# Patient Record
Sex: FEMALE | Race: WHITE | NOT HISPANIC OR LATINO | Employment: PART TIME | URBAN - METROPOLITAN AREA
[De-identification: names, ages, dates, MRNs, and addresses within clinical notes are randomized per-mention and may not be internally consistent; named-entity substitution may affect disease eponyms.]

---

## 2017-01-10 ENCOUNTER — OFFICE VISIT - HEALTHEAST (OUTPATIENT)
Dept: FAMILY MEDICINE | Facility: CLINIC | Age: 42
End: 2017-01-10

## 2017-01-10 DIAGNOSIS — R87.619 ABNORMAL PAP SMEAR OF CERVIX: ICD-10-CM

## 2017-01-10 DIAGNOSIS — F41.9 ANXIETY: ICD-10-CM

## 2017-01-10 DIAGNOSIS — Z11.3 SCREEN FOR STD (SEXUALLY TRANSMITTED DISEASE): ICD-10-CM

## 2017-01-10 LAB — HIV 1+2 AB+HIV1 P24 AG SERPL QL IA: NEGATIVE

## 2017-01-11 LAB
HAV IGM SERPL QL IA: NEGATIVE
HBV CORE IGM SERPL QL IA: NEGATIVE
HBV SURFACE AG SERPL QL IA: NEGATIVE
HCV AB SERPL QL IA: NEGATIVE
HSV1 IGG SERPL QL IA: POSITIVE
HSV2 IGG SERPL QL IA: NEGATIVE
SYPHILIS RPR SCREEN - HISTORICAL: NORMAL

## 2017-01-13 ENCOUNTER — COMMUNICATION - HEALTHEAST (OUTPATIENT)
Dept: FAMILY MEDICINE | Facility: CLINIC | Age: 42
End: 2017-01-13

## 2017-01-13 DIAGNOSIS — F39 MOOD DISORDER (H): ICD-10-CM

## 2017-01-13 DIAGNOSIS — F41.9 ANXIETY: ICD-10-CM

## 2017-01-16 ENCOUNTER — COMMUNICATION - HEALTHEAST (OUTPATIENT)
Dept: FAMILY MEDICINE | Facility: CLINIC | Age: 42
End: 2017-01-16

## 2017-01-17 LAB
HPV INTERPRETATION - HISTORICAL: ABNORMAL
HPV INTERPRETER - HISTORICAL: ABNORMAL

## 2017-01-18 ENCOUNTER — AMBULATORY - HEALTHEAST (OUTPATIENT)
Dept: FAMILY MEDICINE | Facility: CLINIC | Age: 42
End: 2017-01-18

## 2017-01-18 DIAGNOSIS — R87.619 ABNORMAL PAP SMEAR OF CERVIX: ICD-10-CM

## 2017-01-18 LAB
BKR LAB AP ABNORMAL BLEEDING: NO
BKR LAB AP BIRTH CONTROL/HORMONES: ABNORMAL
BKR LAB AP CERVICAL APPEARANCE: NORMAL
BKR LAB AP GYN ADEQUACY: ABNORMAL
BKR LAB AP GYN INTERPRETATION: ABNORMAL
BKR LAB AP HPV REFLEX: ABNORMAL
BKR LAB AP LMP: ABNORMAL
BKR LAB AP PATIENT STATUS: ABNORMAL
BKR LAB AP PREVIOUS ABNORMAL: 2016
BKR LAB AP PREVIOUS NORMAL: ABNORMAL
HIGH RISK?: NO
PATH REPORT.COMMENTS IMP SPEC: ABNORMAL
RESULT FLAG (HE HISTORICAL CONVERSION): ABNORMAL

## 2017-01-20 ENCOUNTER — RECORDS - HEALTHEAST (OUTPATIENT)
Dept: ADMINISTRATIVE | Facility: OTHER | Age: 42
End: 2017-01-20

## 2017-01-24 ENCOUNTER — COMMUNICATION - HEALTHEAST (OUTPATIENT)
Dept: FAMILY MEDICINE | Facility: CLINIC | Age: 42
End: 2017-01-24

## 2017-01-24 DIAGNOSIS — F41.9 ANXIETY: ICD-10-CM

## 2017-01-25 ENCOUNTER — COMMUNICATION - HEALTHEAST (OUTPATIENT)
Dept: FAMILY MEDICINE | Facility: CLINIC | Age: 42
End: 2017-01-25

## 2017-01-25 RX ORDER — LORAZEPAM 0.5 MG/1
TABLET ORAL
Qty: 30 TABLET | Refills: 0 | Status: SHIPPED | OUTPATIENT
Start: 2017-01-25

## 2017-03-01 ENCOUNTER — RECORDS - HEALTHEAST (OUTPATIENT)
Dept: ADMINISTRATIVE | Facility: OTHER | Age: 42
End: 2017-03-01

## 2017-03-20 ENCOUNTER — COMMUNICATION - HEALTHEAST (OUTPATIENT)
Dept: FAMILY MEDICINE | Facility: CLINIC | Age: 42
End: 2017-03-20

## 2017-06-01 ENCOUNTER — RECORDS - HEALTHEAST (OUTPATIENT)
Dept: ADMINISTRATIVE | Facility: OTHER | Age: 42
End: 2017-06-01

## 2017-06-02 ENCOUNTER — RECORDS - HEALTHEAST (OUTPATIENT)
Dept: ADMINISTRATIVE | Facility: OTHER | Age: 42
End: 2017-06-02

## 2017-06-12 ENCOUNTER — RECORDS - HEALTHEAST (OUTPATIENT)
Dept: ADMINISTRATIVE | Facility: OTHER | Age: 42
End: 2017-06-12

## 2017-06-12 ENCOUNTER — RECORDS - HEALTHEAST (OUTPATIENT)
Dept: BONE DENSITY | Facility: CLINIC | Age: 42
End: 2017-06-12

## 2017-06-12 DIAGNOSIS — K50.80 CROHN'S DISEASE OF BOTH SMALL AND LARGE INTESTINE WITHOUT COMPLICATIONS (H): ICD-10-CM

## 2017-07-07 ENCOUNTER — COMMUNICATION - HEALTHEAST (OUTPATIENT)
Dept: SCHEDULING | Facility: CLINIC | Age: 42
End: 2017-07-07

## 2017-08-17 ENCOUNTER — COMMUNICATION - HEALTHEAST (OUTPATIENT)
Dept: FAMILY MEDICINE | Facility: CLINIC | Age: 42
End: 2017-08-17

## 2017-10-02 ENCOUNTER — RECORDS - HEALTHEAST (OUTPATIENT)
Dept: ADMINISTRATIVE | Facility: OTHER | Age: 42
End: 2017-10-02

## 2017-11-21 ENCOUNTER — OFFICE VISIT - HEALTHEAST (OUTPATIENT)
Dept: FAMILY MEDICINE | Facility: CLINIC | Age: 42
End: 2017-11-21

## 2017-11-21 ENCOUNTER — COMMUNICATION - HEALTHEAST (OUTPATIENT)
Dept: FAMILY MEDICINE | Facility: CLINIC | Age: 42
End: 2017-11-21

## 2017-11-21 DIAGNOSIS — N93.8 DYSFUNCTIONAL UTERINE BLEEDING: ICD-10-CM

## 2017-11-21 DIAGNOSIS — R19.7 DIARRHEA: ICD-10-CM

## 2017-11-21 DIAGNOSIS — R03.0 BLOOD PRESSURE ELEVATED WITHOUT HISTORY OF HTN: ICD-10-CM

## 2017-11-21 DIAGNOSIS — R87.613 HSIL ON PAP SMEAR OF CERVIX: ICD-10-CM

## 2017-11-21 DIAGNOSIS — Z01.818 PREOP EXAMINATION: ICD-10-CM

## 2017-11-21 RX ORDER — DULOXETIN HYDROCHLORIDE 30 MG/1
CAPSULE, DELAYED RELEASE ORAL
Refills: 2 | Status: SHIPPED | COMMUNITY
Start: 2017-11-10 | End: 2023-12-02

## 2017-11-21 ASSESSMENT — MIFFLIN-ST. JEOR: SCORE: 1402.04

## 2017-11-22 LAB
ATRIAL RATE - MUSE: 76 BPM
DIASTOLIC BLOOD PRESSURE - MUSE: NORMAL MMHG
INTERPRETATION ECG - MUSE: NORMAL
P AXIS - MUSE: 37 DEGREES
PR INTERVAL - MUSE: 134 MS
QRS DURATION - MUSE: 94 MS
QT - MUSE: 386 MS
QTC - MUSE: 434 MS
R AXIS - MUSE: 12 DEGREES
SYSTOLIC BLOOD PRESSURE - MUSE: NORMAL MMHG
T AXIS - MUSE: 26 DEGREES
VENTRICULAR RATE- MUSE: 76 BPM

## 2017-12-01 ENCOUNTER — AMBULATORY - HEALTHEAST (OUTPATIENT)
Dept: LAB | Facility: CLINIC | Age: 42
End: 2017-12-01

## 2017-12-01 DIAGNOSIS — R19.7 DIARRHEA: ICD-10-CM

## 2017-12-05 ENCOUNTER — RECORDS - HEALTHEAST (OUTPATIENT)
Dept: ADMINISTRATIVE | Facility: OTHER | Age: 42
End: 2017-12-05

## 2017-12-05 LAB
LAB AP CHARGES (HE HISTORICAL CONVERSION): ABNORMAL
PATH REPORT.COMMENTS IMP SPEC: ABNORMAL
PATH REPORT.COMMENTS IMP SPEC: ABNORMAL
PATH REPORT.FINAL DX SPEC: ABNORMAL
PATH REPORT.GROSS SPEC: ABNORMAL
PATH REPORT.MICROSCOPIC SPEC OTHER STN: ABNORMAL
PATH REPORT.MICROSCOPIC SPEC OTHER STN: ABNORMAL
PATH REPORT.RELEVANT HX SPEC: ABNORMAL
RESULT FLAG (HE HISTORICAL CONVERSION): ABNORMAL

## 2017-12-19 ENCOUNTER — COMMUNICATION - HEALTHEAST (OUTPATIENT)
Dept: FAMILY MEDICINE | Facility: CLINIC | Age: 42
End: 2017-12-19

## 2017-12-19 DIAGNOSIS — I10 HTN (HYPERTENSION): ICD-10-CM

## 2018-02-08 ENCOUNTER — COMMUNICATION - HEALTHEAST (OUTPATIENT)
Dept: FAMILY MEDICINE | Facility: CLINIC | Age: 43
End: 2018-02-08

## 2018-02-22 ENCOUNTER — RECORDS - HEALTHEAST (OUTPATIENT)
Dept: ADMINISTRATIVE | Facility: OTHER | Age: 43
End: 2018-02-22

## 2018-04-27 ENCOUNTER — COMMUNICATION - HEALTHEAST (OUTPATIENT)
Dept: SCHEDULING | Facility: CLINIC | Age: 43
End: 2018-04-27

## 2018-04-28 ENCOUNTER — OFFICE VISIT - HEALTHEAST (OUTPATIENT)
Dept: FAMILY MEDICINE | Facility: CLINIC | Age: 43
End: 2018-04-28

## 2018-04-28 DIAGNOSIS — N94.89 VAGINAL BURNING: ICD-10-CM

## 2018-04-28 DIAGNOSIS — R39.9 UTI SYMPTOMS: ICD-10-CM

## 2018-04-28 DIAGNOSIS — N89.8 VAGINAL DISCHARGE: ICD-10-CM

## 2018-04-28 LAB
ALBUMIN UR-MCNC: NEGATIVE MG/DL
APPEARANCE UR: CLEAR
BACTERIA #/AREA URNS HPF: ABNORMAL HPF
BILIRUB UR QL STRIP: ABNORMAL
CAOX CRY #/AREA URNS HPF: PRESENT /[HPF]
CLUE CELLS: NORMAL
COLOR UR AUTO: YELLOW
GLUCOSE UR STRIP-MCNC: NEGATIVE MG/DL
HGB UR QL STRIP: NEGATIVE
KETONES UR STRIP-MCNC: NEGATIVE MG/DL
LEUKOCYTE ESTERASE UR QL STRIP: NEGATIVE
MUCOUS THREADS #/AREA URNS LPF: ABNORMAL LPF
NITRATE UR QL: NEGATIVE
PH UR STRIP: 5.5 [PH] (ref 5–8)
RBC #/AREA URNS AUTO: ABNORMAL HPF
SP GR UR STRIP: >=1.03 (ref 1–1.03)
SQUAMOUS #/AREA URNS AUTO: ABNORMAL LPF
T PALLIDUM AB SER QL: NEGATIVE
TRICHOMONAS, WET PREP: NORMAL
UROBILINOGEN UR STRIP-ACNC: ABNORMAL
WBC #/AREA URNS AUTO: ABNORMAL HPF
YEAST, WET PREP: NORMAL

## 2018-05-01 LAB
C TRACH DNA SPEC QL PROBE+SIG AMP: NEGATIVE
N GONORRHOEA DNA SPEC QL NAA+PROBE: NEGATIVE

## 2018-06-18 ENCOUNTER — RECORDS - HEALTHEAST (OUTPATIENT)
Dept: ADMINISTRATIVE | Facility: OTHER | Age: 43
End: 2018-06-18

## 2018-06-19 ENCOUNTER — RECORDS - HEALTHEAST (OUTPATIENT)
Dept: ADMINISTRATIVE | Facility: OTHER | Age: 43
End: 2018-06-19

## 2018-06-25 ENCOUNTER — RECORDS - HEALTHEAST (OUTPATIENT)
Dept: ADMINISTRATIVE | Facility: OTHER | Age: 43
End: 2018-06-25

## 2018-07-09 ENCOUNTER — RECORDS - HEALTHEAST (OUTPATIENT)
Dept: ADMINISTRATIVE | Facility: OTHER | Age: 43
End: 2018-07-09

## 2018-12-13 ENCOUNTER — RECORDS - HEALTHEAST (OUTPATIENT)
Dept: ADMINISTRATIVE | Facility: OTHER | Age: 43
End: 2018-12-13

## 2019-01-03 ENCOUNTER — OFFICE VISIT - HEALTHEAST (OUTPATIENT)
Dept: FAMILY MEDICINE | Facility: CLINIC | Age: 44
End: 2019-01-03

## 2019-01-03 DIAGNOSIS — K64.4 EXTERNAL HEMORRHOIDS: ICD-10-CM

## 2019-01-03 DIAGNOSIS — R39.9 UTI SYMPTOMS: ICD-10-CM

## 2019-01-03 DIAGNOSIS — R35.0 URINARY FREQUENCY: ICD-10-CM

## 2019-01-03 LAB
ALBUMIN UR-MCNC: NEGATIVE MG/DL
APPEARANCE UR: CLEAR
BACTERIA #/AREA URNS HPF: ABNORMAL HPF
BILIRUB UR QL STRIP: NEGATIVE
COLOR UR AUTO: YELLOW
GLUCOSE UR STRIP-MCNC: NEGATIVE MG/DL
HGB UR QL STRIP: NEGATIVE
KETONES UR STRIP-MCNC: ABNORMAL MG/DL
LEUKOCYTE ESTERASE UR QL STRIP: ABNORMAL
NITRATE UR QL: NEGATIVE
PH UR STRIP: 6.5 [PH] (ref 5–8)
RBC #/AREA URNS AUTO: ABNORMAL HPF
SP GR UR STRIP: 1.02 (ref 1–1.03)
SQUAMOUS #/AREA URNS AUTO: ABNORMAL LPF
TRANS CELLS #/AREA URNS HPF: ABNORMAL LPF
UROBILINOGEN UR STRIP-ACNC: ABNORMAL
WBC #/AREA URNS AUTO: ABNORMAL HPF
WBC CLUMPS #/AREA URNS HPF: ABNORMAL /[HPF]

## 2019-01-05 ENCOUNTER — AMBULATORY - HEALTHEAST (OUTPATIENT)
Dept: FAMILY MEDICINE | Facility: CLINIC | Age: 44
End: 2019-01-05

## 2019-01-05 ENCOUNTER — COMMUNICATION - HEALTHEAST (OUTPATIENT)
Dept: FAMILY MEDICINE | Facility: CLINIC | Age: 44
End: 2019-01-05

## 2019-01-05 DIAGNOSIS — N30.00 ACUTE CYSTITIS WITHOUT HEMATURIA: ICD-10-CM

## 2019-01-05 LAB — BACTERIA SPEC CULT: ABNORMAL

## 2019-01-06 ENCOUNTER — AMBULATORY - HEALTHEAST (OUTPATIENT)
Dept: FAMILY MEDICINE | Facility: CLINIC | Age: 44
End: 2019-01-06

## 2019-01-06 DIAGNOSIS — K64.9 HEMORRHOIDS, UNSPECIFIED HEMORRHOID TYPE: ICD-10-CM

## 2019-02-25 ENCOUNTER — HOSPITAL ENCOUNTER (OUTPATIENT)
Dept: MAMMOGRAPHY | Facility: CLINIC | Age: 44
Discharge: HOME | End: 2019-02-25
Attending: FAMILY MEDICINE

## 2019-02-25 DIAGNOSIS — Z12.31 VISIT FOR SCREENING MAMMOGRAM: ICD-10-CM

## 2019-03-05 ENCOUNTER — RECORDS - HEALTHEAST (OUTPATIENT)
Dept: ADMINISTRATIVE | Facility: OTHER | Age: 44
End: 2019-03-05

## 2019-03-07 ENCOUNTER — OFFICE VISIT - HEALTHEAST (OUTPATIENT)
Dept: FAMILY MEDICINE | Facility: CLINIC | Age: 44
End: 2019-03-07

## 2019-03-07 DIAGNOSIS — K50.919 CROHN'S DISEASE WITH COMPLICATION, UNSPECIFIED GASTROINTESTINAL TRACT LOCATION (H): ICD-10-CM

## 2019-03-07 DIAGNOSIS — S82.852D CLOSED TRIMALLEOLAR FRACTURE OF LEFT ANKLE WITH ROUTINE HEALING, SUBSEQUENT ENCOUNTER: ICD-10-CM

## 2019-03-07 DIAGNOSIS — L70.0 ACNE VULGARIS: ICD-10-CM

## 2019-03-07 DIAGNOSIS — F32.5 MAJOR DEPRESSIVE DISORDER, SINGLE EPISODE IN FULL REMISSION (H): ICD-10-CM

## 2019-03-07 DIAGNOSIS — Z01.818 PREOP GENERAL PHYSICAL EXAM: ICD-10-CM

## 2019-03-07 DIAGNOSIS — F41.1 ANXIETY STATE: ICD-10-CM

## 2019-03-07 LAB
HCG UR QL: NEGATIVE
HGB BLD-MCNC: 13.9 G/DL (ref 12–16)
POTASSIUM BLD-SCNC: 4.1 MMOL/L (ref 3.5–5)

## 2019-03-07 ASSESSMENT — MIFFLIN-ST. JEOR: SCORE: 1397.51

## 2019-03-11 ENCOUNTER — RECORDS - HEALTHEAST (OUTPATIENT)
Dept: ADMINISTRATIVE | Facility: OTHER | Age: 44
End: 2019-03-11

## 2019-03-14 ENCOUNTER — RECORDS - HEALTHEAST (OUTPATIENT)
Dept: ADMINISTRATIVE | Facility: OTHER | Age: 44
End: 2019-03-14

## 2019-03-26 ENCOUNTER — RECORDS - HEALTHEAST (OUTPATIENT)
Dept: ADMINISTRATIVE | Facility: OTHER | Age: 44
End: 2019-03-26

## 2019-03-28 ENCOUNTER — RECORDS - HEALTHEAST (OUTPATIENT)
Dept: ADMINISTRATIVE | Facility: OTHER | Age: 44
End: 2019-03-28

## 2019-04-02 ENCOUNTER — RECORDS - HEALTHEAST (OUTPATIENT)
Dept: ADMINISTRATIVE | Facility: OTHER | Age: 44
End: 2019-04-02

## 2019-04-24 ENCOUNTER — RECORDS - HEALTHEAST (OUTPATIENT)
Dept: ADMINISTRATIVE | Facility: OTHER | Age: 44
End: 2019-04-24

## 2019-06-04 ENCOUNTER — RECORDS - HEALTHEAST (OUTPATIENT)
Dept: ADMINISTRATIVE | Facility: OTHER | Age: 44
End: 2019-06-04

## 2019-06-14 ENCOUNTER — RECORDS - HEALTHEAST (OUTPATIENT)
Dept: ADMINISTRATIVE | Facility: OTHER | Age: 44
End: 2019-06-14

## 2019-07-09 ENCOUNTER — RECORDS - HEALTHEAST (OUTPATIENT)
Dept: ADMINISTRATIVE | Facility: OTHER | Age: 44
End: 2019-07-09

## 2019-07-31 ENCOUNTER — OFFICE VISIT - HEALTHEAST (OUTPATIENT)
Dept: FAMILY MEDICINE | Facility: CLINIC | Age: 44
End: 2019-07-31

## 2019-07-31 DIAGNOSIS — F41.1 ANXIETY STATE: ICD-10-CM

## 2019-07-31 DIAGNOSIS — Z01.818 PREOP GENERAL PHYSICAL EXAM: ICD-10-CM

## 2019-07-31 DIAGNOSIS — M25.462 PAIN AND SWELLING OF KNEE, LEFT: ICD-10-CM

## 2019-07-31 DIAGNOSIS — F32.5 MAJOR DEPRESSIVE DISORDER, SINGLE EPISODE IN FULL REMISSION (H): ICD-10-CM

## 2019-07-31 DIAGNOSIS — M25.562 PAIN AND SWELLING OF KNEE, LEFT: ICD-10-CM

## 2019-07-31 DIAGNOSIS — L70.0 ACNE VULGARIS: ICD-10-CM

## 2019-07-31 LAB
HCG UR QL: NEGATIVE
HGB BLD-MCNC: 13.8 G/DL (ref 12–16)
POTASSIUM BLD-SCNC: 4 MMOL/L (ref 3.5–5)

## 2019-07-31 ASSESSMENT — MIFFLIN-ST. JEOR: SCORE: 1452.79

## 2019-08-08 ENCOUNTER — RECORDS - HEALTHEAST (OUTPATIENT)
Dept: ADMINISTRATIVE | Facility: OTHER | Age: 44
End: 2019-08-08

## 2019-09-11 ENCOUNTER — RECORDS - HEALTHEAST (OUTPATIENT)
Dept: ADMINISTRATIVE | Facility: OTHER | Age: 44
End: 2019-09-11

## 2019-09-17 ENCOUNTER — RECORDS - HEALTHEAST (OUTPATIENT)
Dept: ADMINISTRATIVE | Facility: OTHER | Age: 44
End: 2019-09-17

## 2019-10-11 ENCOUNTER — OFFICE VISIT - HEALTHEAST (OUTPATIENT)
Dept: FAMILY MEDICINE | Facility: CLINIC | Age: 44
End: 2019-10-11

## 2019-10-11 DIAGNOSIS — B96.89 BACTERIAL VAGINOSIS: ICD-10-CM

## 2019-10-11 DIAGNOSIS — N89.8 VAGINAL ODOR: ICD-10-CM

## 2019-10-11 DIAGNOSIS — N76.0 BACTERIAL VAGINOSIS: ICD-10-CM

## 2019-10-11 LAB
CLUE CELLS: ABNORMAL
TRICHOMONAS, WET PREP: ABNORMAL
YEAST, WET PREP: ABNORMAL

## 2019-10-16 ENCOUNTER — RECORDS - HEALTHEAST (OUTPATIENT)
Dept: ADMINISTRATIVE | Facility: OTHER | Age: 44
End: 2019-10-16

## 2019-10-21 ENCOUNTER — COMMUNICATION - HEALTHEAST (OUTPATIENT)
Dept: FAMILY MEDICINE | Facility: CLINIC | Age: 44
End: 2019-10-21

## 2019-10-21 DIAGNOSIS — B96.89 BV (BACTERIAL VAGINOSIS): ICD-10-CM

## 2019-10-21 DIAGNOSIS — N76.0 BV (BACTERIAL VAGINOSIS): ICD-10-CM

## 2019-10-22 ENCOUNTER — OFFICE VISIT - HEALTHEAST (OUTPATIENT)
Dept: FAMILY MEDICINE | Facility: CLINIC | Age: 44
End: 2019-10-22

## 2019-10-22 DIAGNOSIS — R30.0 DYSURIA: ICD-10-CM

## 2019-10-22 LAB
ALBUMIN UR-MCNC: NEGATIVE MG/DL
APPEARANCE UR: CLEAR
BACTERIA #/AREA URNS HPF: ABNORMAL HPF
BILIRUB UR QL STRIP: NEGATIVE
CLUE CELLS: NORMAL
COLOR UR AUTO: YELLOW
GLUCOSE UR STRIP-MCNC: NEGATIVE MG/DL
HGB UR QL STRIP: ABNORMAL
KETONES UR STRIP-MCNC: NEGATIVE MG/DL
LEUKOCYTE ESTERASE UR QL STRIP: ABNORMAL
NITRATE UR QL: POSITIVE
PH UR STRIP: 7 [PH] (ref 5–8)
RBC #/AREA URNS AUTO: ABNORMAL HPF
SP GR UR STRIP: 1.02 (ref 1–1.03)
SQUAMOUS #/AREA URNS AUTO: ABNORMAL LPF
TRANS CELLS #/AREA URNS HPF: ABNORMAL LPF
TRICHOMONAS, WET PREP: NORMAL
UROBILINOGEN UR STRIP-ACNC: ABNORMAL
WBC #/AREA URNS AUTO: ABNORMAL HPF
YEAST, WET PREP: NORMAL

## 2019-10-23 LAB
BACTERIA SPEC CULT: ABNORMAL
BACTERIA SPEC CULT: ABNORMAL

## 2019-11-06 ENCOUNTER — RECORDS - HEALTHEAST (OUTPATIENT)
Dept: ADMINISTRATIVE | Facility: OTHER | Age: 44
End: 2019-11-06

## 2019-11-09 ENCOUNTER — RECORDS - HEALTHEAST (OUTPATIENT)
Dept: ADMINISTRATIVE | Facility: OTHER | Age: 44
End: 2019-11-09

## 2020-01-08 ENCOUNTER — RECORDS - HEALTHEAST (OUTPATIENT)
Dept: ADMINISTRATIVE | Facility: OTHER | Age: 45
End: 2020-01-08

## 2021-02-22 ENCOUNTER — OFFICE VISIT - HEALTHEAST (OUTPATIENT)
Dept: FAMILY MEDICINE | Facility: CLINIC | Age: 46
End: 2021-02-22

## 2021-02-22 DIAGNOSIS — R05.9 COUGH: ICD-10-CM

## 2021-02-23 ENCOUNTER — AMBULATORY - HEALTHEAST (OUTPATIENT)
Dept: FAMILY MEDICINE | Facility: CLINIC | Age: 46
End: 2021-02-23

## 2021-02-23 DIAGNOSIS — R05.9 COUGH: ICD-10-CM

## 2021-02-23 LAB — SARS-COV-2 PCR RESULT-HE - HISTORICAL: NEGATIVE

## 2021-02-24 ENCOUNTER — COMMUNICATION - HEALTHEAST (OUTPATIENT)
Dept: SCHEDULING | Facility: CLINIC | Age: 46
End: 2021-02-24

## 2021-05-18 ENCOUNTER — RECORDS - HEALTHEAST (OUTPATIENT)
Dept: ADMINISTRATIVE | Facility: OTHER | Age: 46
End: 2021-05-18

## 2021-05-30 ENCOUNTER — RECORDS - HEALTHEAST (OUTPATIENT)
Dept: ADMINISTRATIVE | Facility: CLINIC | Age: 46
End: 2021-05-30

## 2021-05-30 VITALS — WEIGHT: 153.56 LBS | BODY MASS INDEX: 24.41 KG/M2

## 2021-05-31 VITALS — WEIGHT: 159.25 LBS | HEIGHT: 67 IN | BODY MASS INDEX: 24.99 KG/M2

## 2021-05-31 NOTE — PROGRESS NOTES
Preoperative Exam    Scheduled Procedure: right knee surgery  Surgery Date:  8/8/19  Surgery Location: [facility] in [place]    Surgeon:  Dr. Carmen    Assessment/Plan:       1.  Preop general physical exam,   Pain and swelling of knee, left  -     Hemoglobin  -     Potassium  -     Pregnancy (Beta-hCG, Qual), Urine    Surgical Procedure Risk: Low (reported cardiac risk generally < 1%)  Have you had prior anesthesia?: Yes  Have you or any family members had a previous anesthesia reaction:  Yes: nausea  Do you or any family members have a history of a clotting or bleeding disorder?: No  Cardiac Risk Assessment: no increased risk for major cardiac complications    APPROVAL GIVEN to proceed with proposed procedure, without further diagnostic evaluation    Functional Status: Independent  Patient plans to recover at home with family.     2.  Major Depression, Anxiety  Stable on duloxetine  No change in medication in preparation for surgery    3.  Acne vulgaris  Stable on spironolactone  Check a potassium  No change in medication in preparation for surgery    Subjective:      Flor Cisneros is a 43 y.o. female who presents for a preoperative consultation. She injured her right knee and experienced swelling which has not reduced. An orthopedist attributes pain to a meniscus tear and cartilage tear. Patient has not had any anesthesia or bleeding problems in past surgeries. She denies dyspnea, chest pain, emesis, nausea, fevers and chills. She no longer has menstrual cycles.    All other systems reviewed and are negative, other than those listed in the HPI.    Pertinent History  Do you have difficulty breathing or chest pain after walking up a flight of stairs: No  History of obstructive sleep apnea: No  Steroid use in the last 6 months: No  Frequent Aspirin/NSAID use: No  Prior Blood Transfusion: No  Prior Blood Transfusion Reaction: No  If for some reason prior to, during or after the procedure, if it is medically  indicated, would you be willing to have a blood transfusion?:  There is no transfusion refusal.    Current Outpatient Medications   Medication Sig Dispense Refill     DULoxetine (CYMBALTA) 30 MG capsule TK ONE C PO  QAM  2     LORazepam (ATIVAN) 0.5 MG tablet Take one tablet daily as needed 30 tablet 0     spironolactone (ALDACTONE) 100 MG tablet TK 1 T PO DAILY. DO NOT TK IF PREGNANT. MAY LOWER BLOOD PRESSURE. CALL PHYSICIAN IF DIZZINESS DEVELOPS  3     No current facility-administered medications for this visit.         Allergies   Allergen Reactions     Infliximab      Metronidazole      Prochlorperazine Edisylate        Patient Active Problem List   Diagnosis     Crohn's Disease     Rectal Polyps     Major Depression, Single Episode In Remission     Anxiety       History reviewed. No pertinent past medical history.    Past Surgical History:   Procedure Laterality Date     AUGMENTATION MAMMAPLASTY Bilateral 2009    With breast lift       Social History     Socioeconomic History     Marital status:      Spouse name: Not on file     Number of children: Not on file     Years of education: Not on file     Highest education level: Not on file   Occupational History     Not on file   Social Needs     Financial resource strain: Not on file     Food insecurity:     Worry: Not on file     Inability: Not on file     Transportation needs:     Medical: Not on file     Non-medical: Not on file   Tobacco Use     Smoking status: Never Smoker     Smokeless tobacco: Never Used   Substance and Sexual Activity     Alcohol use: Not on file     Drug use: Not on file     Sexual activity: Not on file   Lifestyle     Physical activity:     Days per week: Not on file     Minutes per session: Not on file     Stress: Not on file   Relationships     Social connections:     Talks on phone: Not on file     Gets together: Not on file     Attends Denominational service: Not on file     Active member of club or organization: Not on file      "Attends meetings of clubs or organizations: Not on file     Relationship status: Not on file     Intimate partner violence:     Fear of current or ex partner: Not on file     Emotionally abused: Not on file     Physically abused: Not on file     Forced sexual activity: Not on file   Other Topics Concern     Not on file   Social History Narrative     Not on file       Patient Care Team:  Girish Brady MD as PCP - General          Objective:     Vitals:    07/31/19 1427 07/31/19 1449   BP: (!) 120/96 124/88   Pulse: 85    SpO2: 97%    Weight: 171 lb 5 oz (77.7 kg)    Height: 5' 6.25\" (1.683 m)        Physical Exam:  Constitutional: Patient is oriented to person, place, and time. Patient appears well-developed and well-nourished. No distress.   Head: Normocephalic and atraumatic.   Right Ear: External ear normal.   Left Ear: External ear normal.   Nose: Nose normal.   Mouth/Throat: Oropharynx is clear and moist. No oropharyngeal exudate.   Eyes: Conjunctivae and EOM are normal. Pupils are equal, round, and reactive to light. Right eye exhibits no discharge. Left eye exhibits no discharge. No scleral icterus.   Neck: Neck supple. No JVD present. No tracheal deviation present. No thyromegaly present.   Cardiovascular: Normal rate, regular rhythm, normal heart sounds and intact distal pulses. No murmur heard.   Pulmonary/Chest: Effort normal and breath sounds normal. No stridor. No respiratory distress. Patient has no wheezes, no rales, exhibits no tenderness.   Abdominal: Soft. Bowel sounds are normal. Patient exhibits no distension and no mass. There is no tenderness. There is no rebound and no guarding.   Lymphadenopathy:  Patient has no cervical adenopathy.   Neurological: Patient is alert and oriented to person, place, and time. Patient has normal reflexes. No cranial nerve deficit. Coordination normal.   Skin: Skin is warm and dry. No rash noted. Patient is not diaphoretic. No erythema. No pallor. "   Musculoskeletal: Swelling of the right knee. No redness or warmth to touch.       There are no Patient Instructions on file for this visit.    Labs:  Labs pending at this time.  Results will be reviewed when available.    Immunization History   Administered Date(s) Administered     DT (pediatric) 01/01/1996     Hep A, Adult IM (19yr & older) 08/11/2015     Hep A, historic 03/07/2011     Hep B, historic 01/01/1998     Tdap 05/01/2010     Typhoid, Inj, Inactive 08/11/2015     ADDITIONAL HISTORY SUMMARIZED (2): 3/2/2019 visit reviewed.  DECISION TO OBTAIN EXTRA INFORMATION (1): None.   RADIOLOGY TESTS (1): None.  LABS (1): Labs ordered.  MEDICINE TESTS (1): None.  INDEPENDENT REVIEW (2 each): None.     IGilberto am scribing for and in the presence of, Dr. Balderas.    I, Dr. Girish Martin MD , personally performed the services described in this documentation, as scribed by Gilberto Zuñiga in my presence, and it is both accurate and complete.    Total data points: 3        Electronically signed by Girish Martin MD 07/31/19 2:22 PM

## 2021-06-01 ENCOUNTER — RECORDS - HEALTHEAST (OUTPATIENT)
Dept: ADMINISTRATIVE | Facility: CLINIC | Age: 46
End: 2021-06-01

## 2021-06-01 VITALS — WEIGHT: 144 LBS | BODY MASS INDEX: 22.89 KG/M2

## 2021-06-02 ENCOUNTER — RECORDS - HEALTHEAST (OUTPATIENT)
Dept: ADMINISTRATIVE | Facility: CLINIC | Age: 46
End: 2021-06-02

## 2021-06-02 VITALS — BODY MASS INDEX: 25.71 KG/M2 | HEIGHT: 66 IN | WEIGHT: 160 LBS

## 2021-06-02 VITALS — WEIGHT: 144 LBS | BODY MASS INDEX: 22.89 KG/M2

## 2021-06-02 NOTE — PROGRESS NOTES
Chief Complaint   Patient presents with     vaginal odor         HPI    Patient is here for one day of unspecified vaginal odor. No abnormal vaginal discharge, rash, nor itching. No urinary symptoms. No concern for STIs. No abdominal pain.    ROS: Pertinent ROS noted in HPI.     Allergies   Allergen Reactions     Infliximab      Lactose      Other reaction(s): gas     Metronidazole      Prochlorperazine Edisylate      Thrombin Other (See Comments)     Lupus reaction, rash, LFT elevated       Patient Active Problem List   Diagnosis     Crohn's Disease     Rectal Polyps     Major Depression, Single Episode In Remission     Anxiety       Family History   Problem Relation Age of Onset     Hypertension Father        Social History     Socioeconomic History     Marital status:      Spouse name: Not on file     Number of children: Not on file     Years of education: Not on file     Highest education level: Not on file   Occupational History     Not on file   Social Needs     Financial resource strain: Not on file     Food insecurity:     Worry: Not on file     Inability: Not on file     Transportation needs:     Medical: Not on file     Non-medical: Not on file   Tobacco Use     Smoking status: Never Smoker     Smokeless tobacco: Never Used   Substance and Sexual Activity     Alcohol use: Not on file     Drug use: Not on file     Sexual activity: Not on file   Lifestyle     Physical activity:     Days per week: Not on file     Minutes per session: Not on file     Stress: Not on file   Relationships     Social connections:     Talks on phone: Not on file     Gets together: Not on file     Attends [employer] service: Not on file     Active member of club or organization: Not on file     Attends meetings of clubs or organizations: Not on file     Relationship status: Not on file     Intimate partner violence:     Fear of current or ex partner: Not on file     Emotionally abused: Not on file     Physically abused: Not on  file     Forced sexual activity: Not on file   Other Topics Concern     Not on file   Social History Narrative     Not on file         Objective:    Vitals:    10/11/19 0816   BP: 112/75   Pulse: 85   Resp: 18   Temp: 98.4  F (36.9  C)   SpO2: 98%       Gen:NAD  : Not done. Patient was instructed to obtain vaginal self swab.         Recent Results (from the past 24 hour(s))   Wet Prep, Vaginal   Result Value Ref Range    Yeast Result No yeast seen No yeast seen    Trichomonas No Trichomonas seen No Trichomonas seen    Clue Cells, Wet Prep Clue cells seen (!) No Clue cells seen           Bacterial vaginosis - I confirmed with patient, she requested Flagyl and said she tolerated this well previously (I did discuss alternative like Clindamycin). Avoid alcoholic products as directed.   -     metroNIDAZOLE (FLAGYL) 500 MG tablet; Take 1 tablet (500 mg total) by mouth 2 (two) times a day for 7 days.    Vaginal odor  -     Wet Prep, Vaginal

## 2021-06-02 NOTE — PROGRESS NOTES
Assessment:   1. Dysuria  Symptom is likely secondary to urinary tract infection.   - Urinalysis-UC if Indicated: Positive for nitrate, leukocyte and trace of blood.  - Wet Prep, Vaginal  - Culture, Urine  - nitrofurantoin, macrocrystal-monohydrate, (MACROBID) 100 MG capsule; Take 1 capsule (100 mg total) by mouth 2 (two) times a day for 5 days.  Dispense: 10 capsule; Refill: 0  - fluconazole (DIFLUCAN) 150 MG tablet; Take 1 tablet (150 mg total) by mouth once for 1 dose.  Dispense: 2 tablet; Refill: 0     Plan:   Symptomatic local care discussed.  Vaginal antifungal see orders.  Oral antibiotics see orders.  Follow up with PCP in 2 weeks.     Subjective:   Flor Cisneros is a 43 y.o. female who presents for evaluation of vaginal symptoms of burning and painful perineum and vaginal discharge normal and physiologic. Symptoms have been present for 4 days. Symptoms include urinary symptoms of urinary frequency. Menstrual pattern: bleeding no period. Contraception: none   STI Risk: Very low risk of STD exposure. She has had several positive nitrates and possibly urinary tract infection in the past 10 months.    The following portions of the patient's history were reviewed and updated as appropriate: allergies, current medications, past family history, past medical history, past social history, past surgical history and problem list.    Review of Systems  A 12 point comprehensive review of systems was negative except as noted.      Objective:   /81   Pulse 88   Temp 97.7  F (36.5  C)   Wt 172 lb 4 oz (78.1 kg)   SpO2 98%   BMI 27.59 kg/m    General appearance: alert, appears stated age and cooperative  Head: Normocephalic, without obvious abnormality, atraumatic  Pelvic: cervix normal in appearance, external genitalia normal, no adnexal masses or tenderness, no cervical motion tenderness, rectovaginal septum normal, uterus normal size, shape, and consistency and vagina normal without discharge  Pulses: 2+  and symmetric  Neurologic: Grossly normal

## 2021-06-03 ENCOUNTER — RECORDS - HEALTHEAST (OUTPATIENT)
Dept: ADMINISTRATIVE | Facility: CLINIC | Age: 46
End: 2021-06-03

## 2021-06-03 VITALS — WEIGHT: 171.31 LBS | HEIGHT: 66 IN | BODY MASS INDEX: 27.53 KG/M2

## 2021-06-03 VITALS
DIASTOLIC BLOOD PRESSURE: 81 MMHG | TEMPERATURE: 97.7 F | SYSTOLIC BLOOD PRESSURE: 143 MMHG | BODY MASS INDEX: 27.59 KG/M2 | HEART RATE: 88 BPM | OXYGEN SATURATION: 98 % | WEIGHT: 172.25 LBS

## 2021-06-03 VITALS
BODY MASS INDEX: 27.23 KG/M2 | TEMPERATURE: 98.4 F | WEIGHT: 170 LBS | RESPIRATION RATE: 18 BRPM | DIASTOLIC BLOOD PRESSURE: 75 MMHG | HEART RATE: 85 BPM | OXYGEN SATURATION: 98 % | SYSTOLIC BLOOD PRESSURE: 112 MMHG
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
## 2021-06-14 NOTE — PROGRESS NOTES
Surgery- ablation and leep/stent  DOS - 11/29/17  Location-  Pt will send info on that   Physician - Dr Preston     ASSESSMENT/PLAN:  Flor Cisneros 42 y.o. female with abnormal PAP smear here for preoperative evaluation for LEEP and endometrial ablation.     Preop examination, HSIL on Pap smear of cervix, Dysfunctional uterine bleeding  -     Electrocardiogram Perform - Clinic  -     2(CBC w/o Differential)  -     Basic Metabolic Panel  -     Pregnancy (Beta-hCG, Qual), Urine  Cardiac Risk Stratification and Management:  Active Cardiac Conditions: No  Low Risk surgery: Yes  Functional Capacity >=4METS:  Yes    Surgical Risk: Risk for cardiac event is 0.4%.   I do not have any contraindication to the planned procedure.      Blood pressure elevated without history of HTN  Monitor for now  May need low dose antihypertensive if consistently high    Diarrhea  Likely due to new med viibryd  Will r/o c diff due to recent exposure to C diff patient and current cephalexin for acne  -     C. difficile Toxigenic by PCR; Future      CHIEF COMPLAINT:  Chief Complaint   Patient presents with     Pre-op Exam     ablation         SUBJECTIVE:  Flor Cisneros is a 42 y.o. female who is here for preoperative evaluation. She is having an endometrial ablation and LEEP procedure on November 29, 2017 with Dr. Preston. She has a history of abnormal PAP smears with positive HPV. She did see the OBGYN, and had a repeat PAP smear with them that still showed high risk HPV types. She also has been struggling with heavy periods for quite some time.     Elevated Blood Pressure: Her blood pressure is elevated today at 128/100. She says that her blood pressure is running high for her recently. She feels like she can tell when her blood pressure is high at home because she can have some chest tightness and palpitations.     Anxiety: She is working with a psychiatrist to find a medication that works well for her, and that lessens the decreased  "libido side effects. She is currently weaning off the Cymbalta, and taking the Viibryd. She has been having diarrhea since starting the Viibryd. She has been on cephalexin for skin. Her stools have been loose and watery. She does have Crohn's, but she does not feel like her diarrhea symptoms are Crohn's related. She is having some increased stressors and anxiety lately.     Prior Anesthesia: yes  Previous Anesthesia Reaction:  no  Bleeding Disorder: no    REVIEW OF SYSTEMS:  No fevers or chills. All other systems are negative.     PFSH:  No new history.     History   Smoking Status     Never Smoker   Smokeless Tobacco     Not on file       Family History   Problem Relation Age of Onset     Hypertension Father        No past surgical history on file.    Allergies   Allergen Reactions     Infliximab      Metronidazole      Prochlorperazine Edisylate        Active Ambulatory Problems     Diagnosis Date Noted     Crohn's Disease      Rectal Polyps      Major Depression, Single Episode In Remission      Anxiety      Resolved Ambulatory Problems     Diagnosis Date Noted     No Resolved Ambulatory Problems     No Additional Past Medical History       PHYSICAL EXAM:    Vitals:    11/21/17 1509 11/21/17 1547   BP: (!) 128/100 (!) 142/100   Pulse: 87    SpO2: 98%    Weight: 159 lb 4 oz (72.2 kg)    Height: 5' 6.5\" (1.689 m)        Physical Exam:  Constitutional: Patient is oriented to person, place, and time. Patient appears well-developed and well-nourished. No distress.   Head: Normocephalic and atraumatic.   Right Ear: External ear normal. Ear canal and TM normal.   Left Ear: External ear normal. Ear canal and TM normal.   Nose: Nose normal.   Mouth/Throat: Oropharynx is clear and moist. No oropharyngeal exudate.   Eyes: Conjunctivae and EOM are normal. Pupils are equal, round, and reactive to light. Right eye exhibits no discharge. Left eye exhibits no discharge. No scleral icterus.   Cardiovascular: Normal rate, regular " rhythm, normal heart sounds and intact distal pulses. No murmur heard.   Pulmonary/Chest: Effort normal and breath sounds normal. No stridor. No respiratory distress. Patient has no wheezes, no rales, exhibits no tenderness.   Abdominal: Soft. Bowel sounds are normal. Patient exhibits no distension and no mass. There is no tenderness. There is no rebound and no guarding.   Skin: Skin is warm and dry. No rash noted. Patient is not diaphoretic. No erythema. No pallor.     EKG: normal EKG.     Results for orders placed or performed in visit on 11/21/17   HM2(CBC w/o Differential)   Result Value Ref Range    WBC 7.1 4.0 - 11.0 thou/uL    RBC 4.61 3.80 - 5.40 mill/uL    Hemoglobin 13.6 12.0 - 16.0 g/dL    Hematocrit 41.6 35.0 - 47.0 %    MCV 90 80 - 100 fL    MCH 29.6 27.0 - 34.0 pg    MCHC 32.8 32.0 - 36.0 g/dL    RDW 12.1 11.0 - 14.5 %    Platelets 418 140 - 440 thou/uL    MPV 7.3 7.0 - 10.0 fL   Basic Metabolic Panel   Result Value Ref Range    Sodium 140 136 - 145 mmol/L    Potassium 4.2 3.5 - 5.0 mmol/L    Chloride 103 98 - 107 mmol/L    CO2 26 22 - 31 mmol/L    Anion Gap, Calculation 11 5 - 18 mmol/L    Glucose 82 70 - 125 mg/dL    Calcium 9.5 8.5 - 10.5 mg/dL    BUN 12 8 - 22 mg/dL    Creatinine 0.79 0.60 - 1.10 mg/dL    GFR MDRD Af Amer >60 >60 mL/min/1.73m2    GFR MDRD Non Af Amer >60 >60 mL/min/1.73m2   Pregnancy (Beta-hCG, Qual), Urine   Result Value Ref Range    Pregnancy Test, Urine Negative Negative    Specific Gravity, UA 1.015 1.001 - 1.030   Electrocardiogram Perform - Clinic   Result Value Ref Range    SYSTOLIC BLOOD PRESSURE  mmHg    DIASTOLIC BLOOD PRESSURE  mmHg    VENTRICULAR RATE 76 BPM    ATRIAL RATE 76 BPM    P-R INTERVAL 134 ms    QRS DURATION 94 ms    Q-T INTERVAL 386 ms    QTC CALCULATION (BEZET) 434 ms    P Axis 37 degrees    R AXIS 12 degrees    T AXIS 26 degrees    MUSE DIAGNOSIS       Normal sinus rhythm  Normal ECG  No previous ECGs available           ADDITIONAL HISTORY SUMMARIZED (2):  None.  DECISION TO OBTAIN EXTRA INFORMATION (1): Used surgical risk calculator.   RADIOLOGY TESTS (1): None.  LABS (1): Ordered labs today.  MEDICINE TESTS (1): Ordered EKG today.  INDEPENDENT REVIEW (2 each): Personally reviewed EKG.     ISloe, am scribing for and in the presence of, Dr. Balderas.    Girish KEARNS MD , personally performed the services described in this documentation, as scribed by Sole Panda in my presence, and it is both accurate and complete.    MEDICATIONS:  Current Outpatient Prescriptions   Medication Sig Dispense Refill     cephalexin (KEFLEX) 500 MG capsule TK 1 C PO BID  1     DULoxetine (CYMBALTA) 30 MG capsule TK ONE C PO  QAM  2     LORazepam (ATIVAN) 0.5 MG tablet Take one tablet daily as needed 30 tablet 0     spironolactone (ALDACTONE) 50 MG tablet TK 2 TS PO BID FOR ACNE  1     VIIBRYD 10 mg (7)- 20 mg (23) DsPk   0     No current facility-administered medications for this visit.        Total data points: 5

## 2021-06-15 NOTE — PROGRESS NOTES
Flor Cisneros is a 45 y.o. female who is being evaluated via a billable video visit.      How would you like to obtain your AVS? MyChart.    Will anyone else be joining your video visit? No      Video Start Time: 2:17 PM    Assessment & Plan     Flor was seen today for cough and fyi.    Diagnoses and all orders for this visit:    Cough  -     Symptomatic COVID-19 Virus (CORONAVIRUS) PCR; Future  For now, practice self-observation and remain alert for feelings of feverish, cough, shortness of breath.  Take your temperature daily.  Self isolate in the home.  If you have to go out, facemask and avoid public gatherings, public transportation, and maintain distance (6 feet) from others.       Girish Martin MD  [facility]   Flor Cisneros is 45 y.o. and presents today for the following health issues   HPI   1 wk ago fever T=100 and sore throat.  With congestion and runny nose also.  These symptoms have been better for several days except this morning woke up with dry cough. No shortness of breath. No wheezing.  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    Physical Exam  Constitutional: Patient is oriented to person, place, and time. Patient appears well-developed and well-nourished. No distress.   Head: Normocephalic and atraumatic.   Right Ear: External ear normal.   Left Ear: External ear normal.   Nose: Nose normal.   Eyes: Conjunctivae and EOM are normal. Right eye exhibits no discharge. Left eye exhibits no discharge. No scleral icterus.   Neurological: Patient is alert and oriented to person, place, and time. No cranial nerve deficit. Coordination normal.   Skin: No rash noted. Patient is not diaphoretic. No erythema. No pallor.     Video-Visit Details    Type of  service:  Video Visit    Video End Time (time video stopped): 2:24 PM  Originating Location (pt. Location): Home    Distant Location (provider location):  [facility]     Platform used for Video Visit: Federico

## 2021-06-17 NOTE — PATIENT INSTRUCTIONS - HE
Patient Instructions by Dago Ford MD at 10/11/2019  8:10 AM     Author: Dago Ford MD Service: -- Author Type: Physician    Filed: 10/11/2019  8:51 AM Encounter Date: 10/11/2019 Status: Signed    : Dago Ford MD (Physician)         Patient Education     Bacterial Vaginosis    You have a vaginal infection called bacterial vaginosis (BV). Both good and bad bacteria are present in a healthy vagina. BV occurs when these bacteria get out of balance. The number of bad bacteria increase. And the number of good bacteria decrease. Although BV is associated with sexual activity, it is not a sexually transmitted disease.  BV may or may not cause symptoms. If symptoms do occur, they can include:    Thin, gray, milky-white, or sometimes green discharge    Unpleasant odor or fishy smell    Itching, burning, or pain in or around the vagina  It is not known what causes BV, but certain factors can make the problem more likely. This can include:    Douching    Having sex with a new partner    Having sex with more than one partner  BV will sometimes go away on its own. But treatment is usually recommended. This is because untreated BV can increase the risk of more serious health problems such as:    Pelvic inflammatory disease (PID)     delivery (giving birth to a baby early if youre pregnant)    HIV and certain other sexually transmitted diseases (STDs)    Infection after surgery on the reproductive organs  Home care  General care    BV is most often treated with medicines called antibiotics. These may be given as pills or as a vaginal cream. If antibiotics are prescribed, be sure to use them exactly as directed. Also, be sure to complete all of the medicine, even if your symptoms go away.    Don't douche or having sex during treatment.    If you have sex with a female partner, ask your healthcare provider if she should also be treated.  Prevention    Don't douche.    Don't have sex. If you do  have sex, then take steps to lower your risk:  ? Use condoms when having sex.  ? Limit the number of sexual partners you have.  Follow-up care  Follow up with your healthcare provider, or as advised.  When to seek medical advice  Call your healthcare provider right away if:    You have a fever of 100.4 F (38 C) or higher, or as directed by your provider.    Your symptoms worsen, or they dont go away within a few days of starting treatment.    You have new pain in the lower belly or pelvic region.    You have side effects that bother you or a reaction to the pills or cream youre prescribed.    You or any partners you have sex with have new symptoms, such as a rash, joint pain, or sores.  Date Last Reviewed: 10/1/2017    3222-8971 [employer]. [address] [place] [address], [place] [address]. All rights reserved. This information is not intended as a substitute for professional medical care. Always follow your healthcare professional's instructions.

## 2021-06-17 NOTE — PATIENT INSTRUCTIONS - HE
Patient Instructions by Jeanie Marin CNP at 1/3/2019  2:20 PM     Author: Jeanie Marin CNP Service: -- Author Type: Nurse Practitioner    Filed: 1/3/2019  3:26 PM Encounter Date: 1/3/2019 Status: Addendum    : Jeanie Marin CNP (Nurse Practitioner)    Related Notes: Original Note by Jeanie Marin CNP (Nurse Practitioner) filed at 1/3/2019  3:26 PM         Patient Education     Hemorrhoids    Hemorrhoids are swollen and inflamed veins inside the rectum and near the anus. The rectum is the last several inches of the colon. The anus is the passage between the rectum and the outside of the body.  Causes  The veins can become swollen due to increased pressure in them. This is most often caused by:    Chronic constipation or diarrhea    Straining when having a bowel movement    Sitting too long on the toilet    A low-fiber diet    Pregnancy  Symptoms    Bleeding from the rectum (this may be noticeable after bowel movements)    Lump near the anus    Itching around the anus    Pain around the anus  There are different types of hemorrhoids. Depending on the type you have and the severity, you may be able to treat yourself at home. In some cases, a procedure may be the best treatment option. Your healthcare provider can tell you more about this, if needed.  Home care  General care    To get relief from pain or itching, try:  ? Medicines. Your healthcare provider may recommend stool softeners, suppositories, or laxatives to help manage constipation. Use these exactly as directed.  ? Sitz baths. A sitz bath involves sitting in a few inches of warm bath water. Be careful not to make the water so hot that you burn yourself--test it before sitting in it. Soak for about 10 to 15 minutes a few times a day. This may help relieve pain.  ? Topical products. Your healthcare provider may prescribe or recommend creams, ointments, or pads that can be applied to the hemorrhoid. Use these  exactly as directed.  Tips to help prevent hemorrhoids    Eat more fiber. Fiber adds bulk to stool and absorbs water as it moves through your colon. This makes stool softer and easier to pass.  ? Increase the fiber in your diet with more fiber-rich foods. These include fresh fruit, vegetables, and whole grains.  ? Take a fiber supplement or bulking agent, if advised by your healthcare provider. These include products such as psyllium or methylcellulose.    Drink more water. Your healthcare provider may direct you to drink plenty of water. This can help keep stool soft.    Be more active. Frequent exercise aids digestion and helps prevent constipation. It may also help make bowel movements more regular.    Dont strain during bowel movements. This can make hemorrhoids more likely. Also, dont sit on the toilet for long periods of time.  Follow-up care  Follow up with your healthcare provider as advised. If a culture or imaging tests were done, someone will let you know the results when they are ready. This may take a few days or longer. If your healthcare provider recommends a procedure for your hemorrhoids, these options can be discussed. Options may include surgery and outpatient office treatments.  When to seek medical advice  Call your healthcare provider right away if any of these occur:    Increased bleeding from the rectum    Increased pain around the rectum or anus    Weakness or dizziness  Call 911  Call 911 if any of these occur:    Trouble breathing or swallowing    Fainting or loss of consciousness    Unusually fast heart rate    Vomiting blood    Large amounts of blood in stool or black, tarry stools  Date Last Reviewed: 9/1/2017 2000-2017 [employer]. [address] [place] [address], [place] [address]. All rights reserved. This information is not intended as a substitute for professional medical care. Always follow your healthcare professional's instructions.           Patient Education      Urinary Tract Infections in Women    Urinary tract infections (UTIs) are most often caused by bacteria. These bacteria enter the urinary tract. The bacteria may come from outside the body. Or they may travel from the skin outside the rectum or vagina into the urethra. Female anatomy makes it easy for bacteria from the bowel to enter a womans urinary tract, which is the most common source of UTI. This means women develop UTIs more often than men. Pain in or around the urinary tract is a common UTI symptom. But the only way to know for sure if you have a UTI for the healthcare provider to test your urine. The two tests that may be done are the urinalysis and urine culture.  Types of UTIs    Cystitis. A bladder infection (cystitis) is the most common UTI in women. You may have urgent or frequent urination. You may also have pain, burning when you urinate, and bloody urine.    Urethritis. This is an inflamed urethra, which is the tube that carries urine from the bladder to outside the body. You may have lower stomach or back pain. You may also have urgent or frequent urination.    Pyelonephritis. This is a kidney infection. If not treated, it can be serious and damage your kidneys. In severe cases, you may need to stay in the hospital. You may have a fever and lower back pain.  Medicines to treat a UTI  Most UTIs are treated with antibiotics. These kill the bacteria. The length of time you need to take them depends on the type of infection. It may be as short as 3 days. If you have repeated UTIs, you may need a low-dose antibiotic for several months. Take antibiotics exactly as directed. Dont stop taking them until all of the medicine is gone. If you stop taking the antibiotic too soon, the infection may not go away. You may also develop a resistance to the antibiotic. This can make it much harder to treat.  Lifestyle changes to treat and prevent UTIs  The lifestyle changes below will help get rid of your UTI. They  may also help prevent future UTIs.    Drink plenty of fluids. This includes water, juice, or other caffeine-free drinks. Fluids help flush bacteria out of your body.    Empty your bladder. Always empty your bladder when you feel the urge to urinate. And always urinate before going to sleep. Urine that stays in your bladder can lead to infection. Try to urinate before and after sex as well.    Practice good personal hygiene. Wipe yourself from front to back after using the toilet. This helps keep bacteria from getting into the urethra.    Use condoms during sex. These help prevent UTIs caused by sexually transmitted bacteria. Also don't use spermicides during sex. These can increase the risk for UTIs. Choose other forms of birth control instead. For women who tend to get UTIs after sex, a low-dose of a preventive antibiotic may be used. Be sure to discuss this option with your healthcare provider.    Follow up with your healthcare provider as directed. He or she may test to make sure the infection has cleared. If needed, more treatment may be started.  Date Last Reviewed: 1/1/2017 2000-2017 [employer]. [address] [place] [id number]. All rights reserved. This information is not intended as a substitute for professional medical care. Always follow your healthcare professional's instructions.

## 2021-06-17 NOTE — PROGRESS NOTES
Assessment/Plan:     Patient presents to clinic today with symptoms of a resolving urinary tract infection status post nearly 5 days of Macrobid, and a vaginal burning sensation.  Given a short break she had with her significant other, she would like to be tested for sexual transmitted infections, although she has a low suspicion that this is what is causing her discomfort.  She understands those results will return in several days and this will contact her if they are positive.  She declines HIV despite an intense discussion about the benefit of being tested.  Wet prep today demonstrated negative for all 3 pathogens and patient's UA showed no evidence of lingering urinary tract infection but she did have small bilirubin.  Had an extensive discussion with patient about her options.  As the swab was negative, we could choose to do nothing, right a pocket prescription, or empirically treat.  Patient would prefer a pocket prescription of Flagyl.  She has previously tolerated this medication although it does show up on her allergy list.  This was provided for her.    Problem List Items Addressed This Visit     None      Visit Diagnoses     UTI symptoms    -  Primary    Relevant Medications    nitrofurantoin, macrocrystal-monohydrate, (MACROBID) 100 MG capsule    Other Relevant Orders    Urinalysis-UC if Indicated (Completed)    Vaginal discharge        Relevant Medications    metroNIDAZOLE (FLAGYL) 500 MG tablet    Other Relevant Orders    Chlamydia trachomatis & Neisseria gonorrhoeae, Amplified Detection    Syphilis Screen, [facility]    Wet Prep, Vaginal (Completed)    Vaginal burning        Relevant Orders    Chlamydia trachomatis & Neisseria gonorrhoeae, Amplified Detection    Syphilis Screen, [facility]    Wet Prep, Vaginal (Completed)          Return to clinic PRN.    Total time spent with patient was 15 minutes with greater than 50% spent in face-to-face counseling regarding the above plan.    Subjective:      Flor PERSON  Blayne is a 42 y.o. female who presents to clinic for vaginal pain x 1 week.    Patient and her boyfriend were on a 1 month break but became intermittent again 1 week ago.  She states that had copious amounts of intercourse and almost immediately afterwards she experienced symptoms similar to urinary tract infection.  She frequently gets urinary tract infections after intercourse.  She was able to obtain a course of Macrobid, 100 mg twice daily for 5 days.  She is almost completed this course.  She noticed an improvement in the symptoms related to her suspected urinary tract infection, but almost immediately developed a burning sensation in her vagina.  She would like to be screened for sexually transmitted infections, but not HIV.  She would also like a wet prep to screen for yeast or bacterial vaginosis, something she is also prone to.  She has been treating with vitamin C and maryam hips with fairly good effect previously, but it did not take with a sensation today.    Past Medical History, Family History, and Social History reviewed.     Current Outpatient Prescriptions on File Prior to Visit   Medication Sig Dispense Refill     LORazepam (ATIVAN) 0.5 MG tablet Take one tablet daily as needed 30 tablet 0     cephalexin (KEFLEX) 500 MG capsule TK 1 C PO BID  1     DULoxetine (CYMBALTA) 30 MG capsule TK ONE C PO  QAM  2     metoprolol succinate (TOPROL XL) 25 MG Take 1 tablet (25 mg total) by mouth daily. 30 tablet 0     spironolactone (ALDACTONE) 50 MG tablet TK 2 TS PO BID FOR ACNE  1     VIIBRYD 10 mg (7)- 20 mg (23) DsPk   0     No current facility-administered medications on file prior to visit.        Review of systems is as stated in HPI.  The remainder of the 10 system review is otherwise negative.    Objective:     /86  Pulse 74  Resp 18  Wt 144 lb (65.3 kg)  SpO2 99%  BMI 22.89 kg/m2 Body mass index is 22.89 kg/(m^2).    Gen: Alert, NAD, appears stated age, normal hygiene   : Normal external  anatomy, no urethral irritation, thin off white discharge without clumps appreciated in the vaginal vault, no friability of cervix    This note has been dictated using voice recognition software. Any grammatical or context distortions are unintentional and inherent to the the software.     Elis Bradley MD

## 2021-06-20 ENCOUNTER — HEALTH MAINTENANCE LETTER (OUTPATIENT)
Age: 46
End: 2021-06-20

## 2021-06-22 NOTE — PROGRESS NOTES
The steroid suppository was not covered by insurance. I sent in proctofoam and proctozone cream as options for an alternate treatment. Follow up as needed.

## 2021-06-22 NOTE — PROGRESS NOTES
Assessment:     1. Urinary frequency  Urinalysis-UC if Indicated    Culture, Urine   2. External hemorrhoids  hydrocortisone 25 mg suppository   3. UTI symptoms       Results for orders placed or performed in visit on 01/03/19   Urinalysis-UC if Indicated   Result Value Ref Range    Color, UA Yellow Colorless, Yellow, Straw, Light Yellow    Clarity, UA Clear Clear    Glucose, UA Negative Negative    Bilirubin, UA Negative Negative    Ketones, UA Trace (!) Negative    Specific Gravity, UA 1.025 1.005 - 1.030    Blood, UA Negative Negative    pH, UA 6.5 5.0 - 8.0    Protein, UA Negative Negative mg/dL    Urobilinogen, UA 0.2 E.U./dL 0.2 E.U./dL, 1.0 E.U./dL    Nitrite, UA Negative Negative    Leukocytes, UA Small (!) Negative    Bacteria, UA None Seen None Seen hpf    RBC, UA None Seen None Seen, 0-2 hpf    WBC, UA 10-25 (!) None Seen, 0-5 hpf    Squam Epithel, UA 0-5 None Seen, 0-5 lpf    WBC Clumps None Seen None Seen    Trans Epithel, UA 0-5 (!) None Seen lpf            Plan:     Differential diagnosis include but not limited to UTI, Crohn's disease flareup, external hemorrhoids, urinary frequency, or urgency.  Urinalysis was done, patient was small amount of leukocytes.  At this time patient is not interested in starting medications, she will wait until the sensitivity results are available since she recently was on antibiotics.  I agree with the patient since currently her symptoms are tolerable.  Will contact patient once the results are available  For her Crohn's disease, patient denies recent flareup.  An exam was done on the hemorrhoids, patient does have external hemorrhoids but currently seems to be stable, no bleeding noted.  I will give patient a prescription for Hydrocortisone 25 mg suppository to use as needed.  Patient was noted to have some fecal contents in the anal canal, this is where the leakage could be coming from.    I discussed red flag symptoms, return precautions to clinic/ER and follow up  care with patient/parent.  Expected clinical course, symptomatic cares advised. Questions answered. Patient/parent amenable with plan.     Subjective:       43 y.o. female presents for evaluation of UTI symptoms.  Patient reports that her symptoms started about a month ago, about 2 weeks ago she was seen at a [facility] clinic where she was given Macrobid, a few days later a urine culture was positive to Proteus mirabilis resistant to Macrobid she was given another prescription of Vantin.  She completed the course of the prescription.  And now she feels like her symptoms never cleared up.  She has cloudy urine and continues to have urinary frequency.  Currently she has no fever, has history of kidney stone but denies any flank pain.  She also does not complaint of any chills.  She has history of Crohn's disease and has hemorrhoids and recently she also noticed that she is having a lot of leakage of stools.  She has not used any medications other than her normal current medications.  She denies having flareups for her hemorrhoids or her Crohn's disease.  She denies any other symptoms including nausea, vomiting, diarrhea, or shortness of breath.    The following portions of the patient's history were reviewed and updated as appropriate: allergies, current medications, past family history, past medical history, past social history, past surgical history and problem list.    Review of Systems  A 12 point comprehensive review of systems was negative except as noted.      Objective:      /64   Pulse 81   Temp 97  F (36.1  C) (Oral)   Resp 18   Wt 144 lb (65.3 kg)   SpO2 99%   BMI 22.89 kg/m    General appearance: alert, appears stated age, cooperative and moderate distress  Head: Normocephalic, without obvious abnormality, atraumatic  Throat: lips, mucosa, and tongue normal; teeth and gums normal  Lungs: clear to auscultation bilaterally  Heart: regular rate and rhythm, S1, S2 normal, no murmur, click, rub or  gallop  Abdomen: abnormal findings:  Negative for suprapubic tenderness or flank pain tenderness Negative for lower abdominal pain tenderness  Extremities: extremities normal, atraumatic, no cyanosis or edema  Pulses: 2+ and symmetric  Skin: External hemorrhoids noted in the anal canal, stools also noted.  Hemorrhoids grade 2.  Lymph nodes: Cervical, supraclavicular, and axillary nodes normal.  Neurologic: Grossly normal     This note has been dictated using voice recognition software. Any grammatical or context distortions are unintentional and inherent to the software

## 2021-06-22 NOTE — TELEPHONE ENCOUNTER
Dr Marin    [facility] [employer] [facility] plan does not cover hydrocortisone 25 mg suppository    Current RX:hydrocortisone 25 mg suppository    Insurance plan covers the following alternatives: Proctozone 2.5% cream    Please send new RX for alternative    Thank you

## 2021-06-24 NOTE — PROGRESS NOTES
Preoperative Exam    Scheduled Procedure: left ankle  Surgery Date:  3/14/19  Surgery Location: [facility] in [place]    Surgeon:  Dr Perez    Assessment/Plan:     1.  Preop general physical exam, trimalleolar fracture of left ankle   -     Hemoglobin  -     Potassium  -     Pregnancy (Beta-hCG, Qual), Urine    Surgical Procedure Risk: Low (reported cardiac risk generally < 1%)  Have you had prior anesthesia?: Yes  Have you or any family members had a previous anesthesia reaction:  No  Do you or any family members have a history of a clotting or bleeding disorder?: No  Cardiac Risk Assessment: no increased risk for major cardiac complications    Patient approved for surgery with general or local anesthesia.    Functional Status: Independent  Patient plans to recover at home with family.     2.  Acne vulgaris  Stable on spironolactone 100 mg  Potassium normal  No change in medication in preparation for surgery    3.  Major Depression, Anxiety  Stable on Cymbalta 30 mg  No change in medication in preparation for surgery    4.  Crohn's disease with complication, unspecified gastrointestinal tract location (H)  Stable at this time    Subjective:      Flor Cisneros is a 43 y.o. female who presents for a preoperative consultation.  Patient is having left ankle surgery in one week (3/14/19). Patient explains that she slipped on the ice five days ago and suffered a trimalleolar fracture. She notes that she no longer gets her period. She has experienced nausea during previous surgeries that are long. She denies any other issues with anesthesia. She denies any recent cold or illness.     All other systems reviewed and are negative, other than those listed in the HPI.    Pertinent History  Do you have difficulty breathing or chest pain after walking up a flight of stairs: No  History of obstructive sleep apnea: No  Steroid use in the last 6 months: No  Frequent Aspirin/NSAID use: No  Prior Blood Transfusion: No  Prior Blood  Transfusion Reaction: No  If for some reason prior to, during or after the procedure, if it is medically indicated, would you be willing to have a blood transfusion?:  There is no transfusion refusal.    Current Outpatient Medications   Medication Sig Dispense Refill     DULoxetine (CYMBALTA) 30 MG capsule TK ONE C PO  QAM  2     LORazepam (ATIVAN) 0.5 MG tablet Take one tablet daily as needed 30 tablet 0     spironolactone (ALDACTONE) 100 MG tablet TK 1 T PO DAILY. DO NOT TK IF PREGNANT. MAY LOWER BLOOD PRESSURE. CALL PHYSICIAN IF DIZZINESS DEVELOPS  3     No current facility-administered medications for this visit.         Allergies   Allergen Reactions     Infliximab      Metronidazole      Prochlorperazine Edisylate        Patient Active Problem List   Diagnosis     Crohn's Disease     Rectal Polyps     Major Depression, Single Episode In Remission     Anxiety       No past medical history on file.    Past Surgical History:   Procedure Laterality Date     AUGMENTATION MAMMAPLASTY Bilateral 2009    With breast lift       Social History     Socioeconomic History     Marital status:      Spouse name: Not on file     Number of children: Not on file     Years of education: Not on file     Highest education level: Not on file   Occupational History     Not on file   Social Needs     Financial resource strain: Not on file     Food insecurity:     Worry: Not on file     Inability: Not on file     Transportation needs:     Medical: Not on file     Non-medical: Not on file   Tobacco Use     Smoking status: Never Smoker     Smokeless tobacco: Never Used   Substance and Sexual Activity     Alcohol use: Not on file     Drug use: Not on file     Sexual activity: Not on file   Lifestyle     Physical activity:     Days per week: Not on file     Minutes per session: Not on file     Stress: Not on file   Relationships     Social connections:     Talks on phone: Not on file     Gets together: Not on file     Attends  "Latter day service: Not on file     Active member of club or organization: Not on file     Attends meetings of clubs or organizations: Not on file     Relationship status: Not on file     Intimate partner violence:     Fear of current or ex partner: Not on file     Emotionally abused: Not on file     Physically abused: Not on file     Forced sexual activity: Not on file   Other Topics Concern     Not on file   Social History Narrative     Not on file       Patient Care Team:  Girish Brady MD as PCP - General      Objective:     Vitals:    03/07/19 0837   BP: 110/78   Pulse: 88   SpO2: 97%   Weight: 160 lb (72.6 kg)   Height: 5' 6\" (1.676 m)         Physical Exam:    Objective:    Physical Exam   Vitals:    03/07/19 0837   BP: 110/78   Pulse: 88   SpO2: 97%      Constitutional: Patient is oriented to person, place, and time. Patient appears well-developed and well-nourished. No distress.   Head: Normocephalic and atraumatic.   Right Ear: External ear normal. Normal TM  Left Ear: External ear normal. Normal TM  Nose: Nose normal.   Mouth/Throat: Oropharynx is clear and moist. No oropharyngeal exudate.   Eyes: Conjunctivae and EOM are normal. Pupils are equal, round, and reactive to light. Right eye exhibits no discharge. Left eye exhibits no discharge. No scleral icterus.   Neck: Neck supple. No JVD present. No tracheal deviation present. No thyromegaly present.   Cardiovascular: Normal rate, regular rhythm, normal heart sounds and intact distal pulses. No murmur heard.   Pulmonary/Chest: Effort normal and breath sounds normal. No stridor. No respiratory distress. Patient has no wheezes, no rales, exhibits no tenderness.   Skin: Skin is warm and dry. No rash noted. Patient is not diaphoretic. No erythema. No pallor.       ADDITIONAL HISTORY SUMMARIZED (2): None.  DECISION TO OBTAIN EXTRA INFORMATION (1): None.   RADIOLOGY TESTS (1): None.  LABS (1): Ordered labs. Ordered pregnancy test.   MEDICINE " TESTS (1): None.  INDEPENDENT REVIEW (2 each): None.     Total data points = 1      Results for orders placed or performed in visit on 03/07/19   Hemoglobin   Result Value Ref Range    Hemoglobin 13.9 12.0 - 16.0 g/dL   Potassium   Result Value Ref Range    Potassium 4.1 3.5 - 5.0 mmol/L   Pregnancy (Beta-hCG, Qual), Urine   Result Value Ref Range    Pregnancy Test, Urine Negative Negative          Immunization History   Administered Date(s) Administered     DT (pediatric) 01/01/1996     Hep A, Adult IM (19yr & older) 08/11/2015     Hep A, historic 03/07/2011     Hep B, historic 01/01/1998     Tdap 05/01/2010     Typhoid, Inj, Inactive 08/11/2015       By signing my name below, I, William Baker, attest that this documentation has been prepared under the direction and in the presence of Dr. Jennifer Balderas.  Electronic Signature: Brenda Robison. 3/07/2019 8:41 AM.    I, Dr. Girish Martin MD , personally performed the services described in this documentation. All medical record entries made by the scribe were at my direction and in my presence. I have reviewed the chart and discharge instructions (if applicable) and agree that the record reflects my personal performance and is accurate and complete.      Electronically signed by Girish Martin MD 03/07/19 8:32 AM

## 2021-08-13 ENCOUNTER — E-VISIT (OUTPATIENT)
Dept: FAMILY MEDICINE | Facility: CLINIC | Age: 46
End: 2021-08-13
Payer: COMMERCIAL

## 2021-08-13 DIAGNOSIS — Z20.822 SUSPECTED COVID-19 VIRUS INFECTION: Primary | ICD-10-CM

## 2021-08-13 PROCEDURE — 99421 OL DIG E/M SVC 5-10 MIN: CPT | Performed by: NURSE PRACTITIONER

## 2021-08-13 NOTE — PATIENT INSTRUCTIONS
Dear Flor Cisneros,    Your symptoms show that you may have coronavirus (COVID-19). This illness can cause fever, cough and trouble breathing. Many people get a mild case and get better on their own. Some people can get very sick.    Will I be tested for COVID-19?  We would like to test you for Covid-19 virus. I have placed orders for this test.     For all employees or close contacts (except [place] and [employer] - see below), go to your [employer] home page and scroll down to the section that says  You have an appointment that needs to be scheduled  and click the large green button that says  Schedule Now  and follow the steps to find the next available opening.     If you are unable to complete these steps or if you cannot find any available times, please call [phone] to schedule employee testing.     [place] employees or close contacts, please call [phone].   [place] ([employer]) employees or close contacts call [phone].    Return to work/school/ guidance:  Please let your workplace manager and staffing office know when your quarantine ends     We can t give you an exact date as it depends on the above. You can calculate this on your own or work with your manager/staffing office to calculate this. (For example if you were exposed on 10/4, you would have to quarantine for 14 full days. That would be through 10/18. You could return on 10/19.)      If you receive a positive COVID-19 test result, follow the guidance of the those who are giving you the results. Usually the return to work is 10 (or in some cases 20 days from symptom onset.) If you work at [employer], you must also be cleared by Employee Occupational Health and Safety to return to work.        If you receive a negative COVID-19 test result and did not have a high risk exposure to someone with a known positive COVID-19 test, you can return to work once you're free of fever for 24 hours without fever-reducing medication and  your symptoms are improving or resolved.      If you receive a negative COVID-19 test and If you had a high risk exposure to someone who has tested positive for COVID-19 then you can return to work 14 days after your last contact with the positive individual    Note: If you have ongoing exposure to the covid positive person, this quarantine period may be more than 14 days. (For example, if you are continued to be exposed to your child who tested positive and cannot isolate from them, then the quarantine of 7-14 days can't start until your child is no longer contagious. This is typically 10 days from onset of the child's symptoms. So the total duration may be 17-24 days in this case.)    Sign up for [employer].   We know it's scary to hear that you might have COVID-19. We want to track your symptoms to make sure you're okay over the next 2 weeks. Please look for an email from [employer]--this is a free, online program that we'll use to keep in touch. To sign up, follow the link in the email you will receive. Learn more at [email or web address]    How can I take care of myself?    Get lots of rest. Drink extra fluids (unless a doctor has told you not to)    Take Tylenol (acetaminophen) or ibuprofen for fever or pain. If you have liver or kidney problems, ask your family doctor if it's okay to take Tylenol o ibuprofen    If you have other health problems (like cancer, heart failure, an organ transplant or severe kidney disease): Call your specialty clinic if you don't feel better in the next 2 days.    Know when to call 911. Emergency warning signs include:  o Trouble breathing or shortness of breath  o Pain or pressure in the chest that doesn't go away  o Feeling confused like you haven't felt before, or not being able to wake up  o Bluish-colored lips or face    Where can I get more information?   [facility] - About COVID-19:   [email or web address]    CDC - What to Do If You're Sick:    www.cdc.gov/coronavirus/2019-ncov/about/steps-when-sick.html    August 13, 2021  RE:  Flor Cisneros                                                                                                                  [address] [place] [address]      To whom it may concern:    I evaluated Flor Cisneros on August 13, 2021. Flor Cisneros should be excused from work/school.     They should let their workplace manager and staffing office know when their quarantine ends.    We can not give an exact date as it depends on the information below. They can calculate this on their own or work with their manager/staffing office to calculate this. (For example if they were exposed on 10/04, they would have to quarantine for 14 full days. That would be through 10/18. They could return on 10/19.)    Quarantine Guidelines:      If patient receives a positive COVID-19 test result, they should follow the guidance of those who are giving the results. Usually the return to work is 10 (or in some cases 20 days from symptom onset.) If they work at [employer], they must be cleared by Employee Occupational Health and Safety to return to work.        If patient receives a negative COVID-19 test result and did not have a high risk exposure to someone with a known positive COVID-19 test, they can return to work once they're free of fever for 24 hours without fever-reducing medication and their symptoms are improving or resolved.      If patient receives a negative COVID-19 test and if they had a high risk exposure to someone who has tested positive for COVID-19 then they can return to work 14 days after their last contact with the positive individual    Note: If there is ongoing exposure to the covid positive person, this quarantine period may be longer than 14 days. (For example, if they are continually exposed to their child, who tested positive and cannot isolate from them, then the quarantine of 7-14 days can't start  until their child is no longer contagious. This is typically 10 days from onset to the child's symptoms. So the total duration may be 17-24 days in this case.)     Sincerely,  Radha Quintero, CNP

## 2021-10-11 ENCOUNTER — HEALTH MAINTENANCE LETTER (OUTPATIENT)
Age: 46
End: 2021-10-11

## 2022-05-17 ENCOUNTER — VIRTUAL VISIT (OUTPATIENT)
Dept: FAMILY MEDICINE | Facility: CLINIC | Age: 47
End: 2022-05-17
Payer: COMMERCIAL

## 2022-05-17 DIAGNOSIS — F32.5 MAJOR DEPRESSIVE DISORDER, SINGLE EPISODE IN FULL REMISSION (H): ICD-10-CM

## 2022-05-17 DIAGNOSIS — K50.919 CROHN'S DISEASE WITH COMPLICATION, UNSPECIFIED GASTROINTESTINAL TRACT LOCATION (H): ICD-10-CM

## 2022-05-17 DIAGNOSIS — U07.1 INFECTION DUE TO 2019 NOVEL CORONAVIRUS: Primary | ICD-10-CM

## 2022-05-17 PROCEDURE — 99213 OFFICE O/P EST LOW 20 MIN: CPT | Mod: 95 | Performed by: FAMILY MEDICINE

## 2022-05-17 NOTE — LETTER
5/17/2022        RE: Flor Cisneros  [address]   [place] [address]      Flor Cisneros is a patient of this clinic.  I serve as her primary care physician.  She reported a positive COVID19 test on 5/1/22.  Since then she has fully recovered. She exhibits no active symptoms at the present time.  She is also fully vaccinated for covid and received the booster.  Flor anticipates traveling internationally on 6/21/22.  I do not have any contraindications to her travel plans.  She may travel domestically and internationally without any medical concerns.      Sincerely,        Girish Martin MD

## 2022-05-17 NOTE — PROGRESS NOTES
Flor is a 46 year old who is being evaluated via a billable video visit.      How would you like to obtain your AVS? MyChart  If the video visit is dropped, the invitation should be resent by: Text to cell phone: [phone]  Will anyone else be joining your video visit? No      Video Start Time: 1:57 PM    Assessment & Plan     Infection due to 2019 novel coronavirus  Positive test on May 1, 2022.  She has recovered.  We will provide travel letter.    Major depressive disorder, single episode in full remission (H)  Stable on Cymbalta and as needed lorazepam    Crohn's disease with complication, unspecified gastrointestinal tract location (H)  Stable at this time.     Girish Martin MD  [facility]    Subjective   Flor is a 46 year old who presents for the following health issues     HPI     5/1/22 positive home COVID test  Fully recovered. No active symptoms  Fully vaccinated for covid and booster  Anticipate traveling to [place] 6/21/22        Objective           Vitals:  No vitals were obtained today due to virtual visit.    Physical Exam   Constitutional: Patient is oriented to person, place, and time. Patient appears well-developed and well-nourished. No distress.   Head: Normocephalic and atraumatic.   Right Ear: External ear normal.   Left Ear: External ear normal.   Eyes: Conjunctivae and EOM are normal. Right eye exhibits no discharge. Left eye exhibits no discharge. No scleral icterus.   Neurological: Patient is alert and oriented to person, place, and time.  Skin: No rash noted. Patient is not diaphoretic. No erythema. No pallor.    Video-Visit Details    Type of service:  Video Visit    Video End Time:2:04 PM    Originating Location (pt. Location): Home    Distant Location (provider location):  [facility]     Platform used for Video Visit: [employer]

## 2022-07-17 ENCOUNTER — HEALTH MAINTENANCE LETTER (OUTPATIENT)
Age: 47
End: 2022-07-17

## 2022-09-06 ENCOUNTER — TRANSFERRED RECORDS (OUTPATIENT)
Dept: HEALTH INFORMATION MANAGEMENT | Facility: CLINIC | Age: 47
End: 2022-09-06

## 2022-09-25 ENCOUNTER — HEALTH MAINTENANCE LETTER (OUTPATIENT)
Age: 47
End: 2022-09-25

## 2023-01-30 ENCOUNTER — HEALTH MAINTENANCE LETTER (OUTPATIENT)
Age: 48
End: 2023-01-30

## 2023-03-06 ENCOUNTER — OFFICE VISIT (OUTPATIENT)
Dept: FAMILY MEDICINE | Facility: CLINIC | Age: 48
End: 2023-03-06
Payer: COMMERCIAL

## 2023-03-06 VITALS
WEIGHT: 179 LBS | OXYGEN SATURATION: 100 % | TEMPERATURE: 98.2 F | BODY MASS INDEX: 28.77 KG/M2 | DIASTOLIC BLOOD PRESSURE: 96 MMHG | SYSTOLIC BLOOD PRESSURE: 147 MMHG | HEART RATE: 74 BPM | RESPIRATION RATE: 12 BRPM | HEIGHT: 66 IN

## 2023-03-06 DIAGNOSIS — N89.8 VAGINAL ODOR: ICD-10-CM

## 2023-03-06 DIAGNOSIS — I10 ESSENTIAL HYPERTENSION: ICD-10-CM

## 2023-03-06 DIAGNOSIS — F32.5 MAJOR DEPRESSIVE DISORDER, SINGLE EPISODE IN FULL REMISSION (H): Primary | ICD-10-CM

## 2023-03-06 DIAGNOSIS — K50.919 CROHN'S DISEASE WITH COMPLICATION, UNSPECIFIED GASTROINTESTINAL TRACT LOCATION (H): ICD-10-CM

## 2023-03-06 DIAGNOSIS — F41.1 ANXIETY STATE: ICD-10-CM

## 2023-03-06 DIAGNOSIS — Z12.31 VISIT FOR SCREENING MAMMOGRAM: ICD-10-CM

## 2023-03-06 DIAGNOSIS — Z13.220 SCREENING FOR HYPERLIPIDEMIA: ICD-10-CM

## 2023-03-06 LAB
ALBUMIN SERPL BCG-MCNC: 4.3 G/DL (ref 3.5–5.2)
ALP SERPL-CCNC: 61 U/L (ref 35–104)
ALT SERPL W P-5'-P-CCNC: 10 U/L (ref 10–35)
ANION GAP SERPL CALCULATED.3IONS-SCNC: 11 MMOL/L (ref 7–15)
AST SERPL W P-5'-P-CCNC: 22 U/L (ref 10–35)
BILIRUB SERPL-MCNC: 0.3 MG/DL
BUN SERPL-MCNC: 13.6 MG/DL (ref 6–20)
CALCIUM SERPL-MCNC: 9.2 MG/DL (ref 8.6–10)
CHLORIDE SERPL-SCNC: 103 MMOL/L (ref 98–107)
CHOLEST SERPL-MCNC: 220 MG/DL
CLUE CELLS: ABNORMAL
CREAT SERPL-MCNC: 0.82 MG/DL (ref 0.51–0.95)
DEPRECATED HCO3 PLAS-SCNC: 26 MMOL/L (ref 22–29)
GFR SERPL CREATININE-BSD FRML MDRD: 89 ML/MIN/1.73M2
GLUCOSE SERPL-MCNC: 88 MG/DL (ref 70–99)
HBA1C MFR BLD: 5.2 % (ref 0–5.6)
HDLC SERPL-MCNC: 68 MG/DL
LDLC SERPL CALC-MCNC: 121 MG/DL
NONHDLC SERPL-MCNC: 152 MG/DL
POTASSIUM SERPL-SCNC: 3.9 MMOL/L (ref 3.4–5.3)
PROT SERPL-MCNC: 7.4 G/DL (ref 6.4–8.3)
SODIUM SERPL-SCNC: 140 MMOL/L (ref 136–145)
TRICHOMONAS, WET PREP: ABNORMAL
TRIGL SERPL-MCNC: 155 MG/DL
WBC'S/HIGH POWER FIELD, WET PREP: ABNORMAL
YEAST, WET PREP: ABNORMAL

## 2023-03-06 PROCEDURE — 80061 LIPID PANEL: CPT | Performed by: NURSE PRACTITIONER

## 2023-03-06 PROCEDURE — 87210 SMEAR WET MOUNT SALINE/INK: CPT | Performed by: NURSE PRACTITIONER

## 2023-03-06 PROCEDURE — 83036 HEMOGLOBIN GLYCOSYLATED A1C: CPT | Performed by: NURSE PRACTITIONER

## 2023-03-06 PROCEDURE — 99214 OFFICE O/P EST MOD 30 MIN: CPT | Performed by: NURSE PRACTITIONER

## 2023-03-06 PROCEDURE — 36415 COLL VENOUS BLD VENIPUNCTURE: CPT | Performed by: NURSE PRACTITIONER

## 2023-03-06 PROCEDURE — 80053 COMPREHEN METABOLIC PANEL: CPT | Performed by: NURSE PRACTITIONER

## 2023-03-06 RX ORDER — AMLODIPINE BESYLATE 10 MG/1
5 TABLET ORAL DAILY
Qty: 30 TABLET | Refills: 1 | Status: SHIPPED | OUTPATIENT
Start: 2023-03-06 | End: 2023-04-04

## 2023-03-06 ASSESSMENT — PATIENT HEALTH QUESTIONNAIRE - PHQ9
10. IF YOU CHECKED OFF ANY PROBLEMS, HOW DIFFICULT HAVE THESE PROBLEMS MADE IT FOR YOU TO DO YOUR WORK, TAKE CARE OF THINGS AT HOME, OR GET ALONG WITH OTHER PEOPLE: NOT DIFFICULT AT ALL
SUM OF ALL RESPONSES TO PHQ QUESTIONS 1-9: 0
SUM OF ALL RESPONSES TO PHQ QUESTIONS 1-9: 0
5. POOR APPETITE OR OVEREATING: NOT AT ALL

## 2023-03-06 ASSESSMENT — ENCOUNTER SYMPTOMS
ABDOMINAL PAIN: 0
CHILLS: 0
NEUROLOGICAL NEGATIVE: 1
PSYCHIATRIC NEGATIVE: 1
GASTROINTESTINAL NEGATIVE: 1
RESPIRATORY NEGATIVE: 1
CARDIOVASCULAR NEGATIVE: 1
FEVER: 0

## 2023-03-06 ASSESSMENT — ANXIETY QUESTIONNAIRES
1. FEELING NERVOUS, ANXIOUS, OR ON EDGE: SEVERAL DAYS
GAD7 TOTAL SCORE: 2
2. NOT BEING ABLE TO STOP OR CONTROL WORRYING: SEVERAL DAYS
3. WORRYING TOO MUCH ABOUT DIFFERENT THINGS: NOT AT ALL
5. BEING SO RESTLESS THAT IT IS HARD TO SIT STILL: NOT AT ALL
GAD7 TOTAL SCORE: 2
7. FEELING AFRAID AS IF SOMETHING AWFUL MIGHT HAPPEN: NOT AT ALL
IF YOU CHECKED OFF ANY PROBLEMS ON THIS QUESTIONNAIRE, HOW DIFFICULT HAVE THESE PROBLEMS MADE IT FOR YOU TO DO YOUR WORK, TAKE CARE OF THINGS AT HOME, OR GET ALONG WITH OTHER PEOPLE: NOT DIFFICULT AT ALL
6. BECOMING EASILY ANNOYED OR IRRITABLE: NOT AT ALL

## 2023-03-06 NOTE — PROGRESS NOTES
"  Assessment & Plan     Visit for screening mammogram    - MA SCREENING DIGITAL BILAT - Future  (s+30)    Screening for hyperlipidemia    - Lipid panel reflex to direct LDL Non-fasting  - Lipid panel reflex to direct LDL Non-fasting    Major Depression, Single Episode In Remission  - stable per patient.     Crohn's disease with complication, unspecified gastrointestinal tract location (H)  -stable per patient/     Anxiety  -- stable per patient.     Essential hypertension  --- BP elevated at home and in office. Start Norvasc and follow up BP in two weeks. Diet discussed.   - Lipid panel reflex to direct LDL Non-fasting  - Comprehensive metabolic panel (BMP + Alb, Alk Phos, ALT, AST, Total. Bili, TP)  - Hemoglobin A1c  - amLODIPine (NORVASC) 10 MG tablet  Dispense: 30 tablet; Refill: 1  - Comprehensive metabolic panel (BMP + Alb, Alk Phos, ALT, AST, Total. Bili, TP)  - Hemoglobin A1c    Vaginal odor    - Wet prep - Clinic Collect    The wet prep is negative but is showing some white cells.  She stated that she we will just continue to monitor her symptoms, but if they are worse, it would be appropriate to order her metronidazole vaginal cream.  If I am away on vacation please order this for the patient.      I discussed her lipids and ASCVD score.   The 10-year ASCVD risk score (Imtiaz DK, et al., 2019) is: 1.2%    Values used to calculate the score:      Age: 47 years      Sex: Female      Is Non- : No      Diabetic: No      Tobacco smoker: No      Systolic Blood Pressure: 147 mmHg      Is BP treated: Yes      HDL Cholesterol: 68 mg/dL      Total Cholesterol: 220 mg/dL     BMI:   Estimated body mass index is 28.89 kg/m  as calculated from the following:    Height as of this encounter: 1.676 m (5' 6\").    Weight as of this encounter: 81.2 kg (179 lb).   Weight management plan: Discussed healthy diet and exercise guidelines        No follow-ups on file.    NANCY Durham [facility] " [facility] LIANA Ray is a 47 year old, presenting for the following health issues:  Vaginal Problem (vaginosis) and Hypertension    -She stated that she has been noticing an elevated blood pressure for some time.  She has been checking it at home, and recently readings have been reading in the 170 range.  She stated that she feels that she has whitecoat syndrome.  She tries to be mindful of her diet, but she does exercise on a regular basis.  She denied any chest pain, shortness of breath, syncope or leg swelling.  She does have a family history of hypertension.   -She stated that she is noticing some vaginal odor since she came back from [place].  She denied any abdominal pain, fevers or chills.  She would like to get a wet prep to assess for BACTERIAL VAGINOSIS.     Vaginal Problem   Pertinent negatives include no fever and no abdominal pain.   History of Present Illness       Reason for visit:  Bacterial vaginosis and HBP  Symptom onset:  1-2 weeks ago  Symptoms include:  Odor  Symptom intensity:  Mild  Symptom progression:  Staying the same  Had these symptoms before:  Yes  Has tried/received treatment for these symptoms:  Yes  Previous treatment was successful:  Yes    She eats 2-3 servings of fruits and vegetables daily.She consumes 0 sweetened beverage(s) daily.She exercises with enough effort to increase her heart rate 30 to 60 minutes per day.  She exercises with enough effort to increase her heart rate 5 days per week.   She is taking medications regularly.    Today's PHQ-9         PHQ-9 Total Score: 0    PHQ-9 Q9 Thoughts of better off dead/self-harm past 2 weeks :   Not at all    How difficult have these problems made it for you to do your work, take care of things at home, or get along with other people: Not difficult at all       Review of Systems   Constitutional: Negative for chills and fever.   Respiratory: Negative.    Cardiovascular: Negative.    Gastrointestinal:  "Negative.  Negative for abdominal pain.   Genitourinary: Positive for vaginal discharge. Negative for vaginal bleeding and vaginal pain.   Neurological: Negative.    Psychiatric/Behavioral: Negative.      Objective    BP (!) 147/96   Pulse 74   Temp 98.2  F (36.8  C) (Oral)   Resp 12   Ht 1.676 m (5' 6\")   Wt 81.2 kg (179 lb)   SpO2 100%   BMI 28.89 kg/m    Body mass index is 28.89 kg/m .  Physical Exam  Vitals and nursing note reviewed.   Constitutional:       Appearance: She is normal weight.   Cardiovascular:      Pulses: Normal pulses.   Pulmonary:      Effort: Pulmonary effort is normal.   Abdominal:      Palpations: Abdomen is soft.   Musculoskeletal:      Right lower leg: No edema.      Left lower leg: No edema.   Skin:     General: Skin is warm.   Neurological:      General: No focal deficit present.      Mental Status: She is alert and oriented to person, place, and time.   Psychiatric:         Mood and Affect: Mood normal.         Behavior: Behavior normal.         Thought Content: Thought content normal.         Judgment: Judgment normal.            Results for orders placed or performed in visit on 03/06/23   Lipid panel reflex to direct LDL Non-fasting     Status: Abnormal   Result Value Ref Range    Cholesterol 220 (H) <200 mg/dL    Triglycerides 155 (H) <150 mg/dL    Direct Measure HDL 68 >=50 mg/dL    LDL Cholesterol Calculated 121 (H) <=100 mg/dL    Non HDL Cholesterol 152 (H) <130 mg/dL    Narrative    Cholesterol  Desirable:  <200 mg/dL    Triglycerides  Normal:  Less than 150 mg/dL  Borderline High:  150-199 mg/dL  High:  200-499 mg/dL  Very High:  Greater than or equal to 500 mg/dL    Direct Measure HDL  Female:  Greater than or equal to 50 mg/dL   Male:  Greater than or equal to 40 mg/dL    LDL Cholesterol  Desirable:  <100mg/dL  Above Desirable:  100-129 mg/dL   Borderline High:  130-159 mg/dL   High:  160-189 mg/dL   Very High:  >= 190 mg/dL    Non HDL Cholesterol  Desirable:  130 " mg/dL  Above Desirable:  130-159 mg/dL  Borderline High:  160-189 mg/dL  High:  190-219 mg/dL  Very High:  Greater than or equal to 220 mg/dL   Comprehensive metabolic panel (BMP + Alb, Alk Phos, ALT, AST, Total. Bili, TP)     Status: Normal   Result Value Ref Range    Sodium 140 136 - 145 mmol/L    Potassium 3.9 3.4 - 5.3 mmol/L    Chloride 103 98 - 107 mmol/L    Carbon Dioxide (CO2) 26 22 - 29 mmol/L    Anion Gap 11 7 - 15 mmol/L    Urea Nitrogen 13.6 6.0 - 20.0 mg/dL    Creatinine 0.82 0.51 - 0.95 mg/dL    Calcium 9.2 8.6 - 10.0 mg/dL    Glucose 88 70 - 99 mg/dL    Alkaline Phosphatase 61 35 - 104 U/L    AST 22 10 - 35 U/L    ALT 10 10 - 35 U/L    Protein Total 7.4 6.4 - 8.3 g/dL    Albumin 4.3 3.5 - 5.2 g/dL    Bilirubin Total 0.3 <=1.2 mg/dL    GFR Estimate 89 >60 mL/min/1.73m2   Hemoglobin A1c     Status: Normal   Result Value Ref Range    Hemoglobin A1C 5.2 0.0 - 5.6 %   Wet prep - Clinic Collect     Status: Abnormal    Specimen: Vagina; Swab   Result Value Ref Range    Trichomonas Absent Absent    Yeast Absent Absent    Clue Cells Absent Absent    WBCs/high power field 3+ (A) None

## 2023-03-07 NOTE — RESULT ENCOUNTER NOTE
I called the patient regarding her labs.     The wet prep is negative but is showing some white cells.  She stated that she we will just continue to monitor her symptoms, but if they are worse, it would be appropriate to order her metronidazole vaginal cream.  If I am away on vacation please order this for the patient.     I discussed her lipids and ASCVD score.   The 10-year ASCVD risk score (Imtiaz COOK, et al., 2019) is: 1.2%    Values used to calculate the score:      Age: 47 years      Sex: Female      Is Non- : No      Diabetic: No      Tobacco smoker: No      Systolic Blood Pressure: 147 mmHg      Is BP treated: Yes      HDL Cholesterol: 68 mg/dL      Total Cholesterol: 220 mg/dL

## 2023-03-28 ENCOUNTER — HOSPITAL ENCOUNTER (OUTPATIENT)
Dept: MAMMOGRAPHY | Facility: CLINIC | Age: 48
Discharge: HOME | End: 2023-03-28
Attending: NURSE PRACTITIONER | Admitting: NURSE PRACTITIONER
Payer: COMMERCIAL

## 2023-03-28 DIAGNOSIS — Z12.31 VISIT FOR SCREENING MAMMOGRAM: ICD-10-CM

## 2023-03-28 PROCEDURE — 77067 SCR MAMMO BI INCL CAD: CPT

## 2023-04-03 DIAGNOSIS — I10 ESSENTIAL HYPERTENSION: ICD-10-CM

## 2023-04-04 RX ORDER — AMLODIPINE BESYLATE 10 MG/1
5 TABLET ORAL DAILY
Qty: 30 TABLET | Refills: 0 | Status: SHIPPED | OUTPATIENT
Start: 2023-04-04 | End: 2023-04-30

## 2023-04-04 NOTE — TELEPHONE ENCOUNTER
"Routing refill request to provider for review/approval because:  Blood pressures over 140/90 failed protocol    Last Written Prescription Date:  3/6/2023  Last Fill Quantity: 30,  # refills: 1   Last office visit provider:  3/6/2023     Requested Prescriptions   Pending Prescriptions Disp Refills     amLODIPine (NORVASC) 10 MG tablet 30 tablet 1     Sig: Take 0.5 tablets (5 mg) by mouth daily       Calcium Channel Blockers Protocol  Failed - 4/4/2023  9:34 AM        Failed - Blood pressure under 140/90 in past 12 months     BP Readings from Last 3 Encounters:   03/06/23 (!) 147/96   10/22/19 (!) 143/81   10/11/19 112/75                 Passed - Recent (12 mo) or future (30 days) visit within the authorizing provider's specialty     Patient has had an office visit with the authorizing provider or a provider within the authorizing providers department within the previous 12 mos or has a future within next 30 days. See \"Patient Info\" tab in inbasket, or \"Choose Columns\" in Meds & Orders section of the refill encounter.              Passed - Medication is active on med list        Passed - Patient is age 18 or older        Passed - No active pregnancy on record        Passed - Normal serum creatinine on file in past 12 months     Recent Labs   Lab Test 03/06/23  1645   CR 0.82       Ok to refill medication if creatinine is low          Passed - No positive pregnancy test in past 12 months             Carissa Dutton RN 04/04/23 9:34 AM  "

## 2023-04-05 NOTE — TELEPHONE ENCOUNTER
4-5-23  Please clarify, are you wanting patients BP readings from home or do you want patient to come to clinic to check BP  khurram

## 2023-04-28 DIAGNOSIS — I10 ESSENTIAL HYPERTENSION: ICD-10-CM

## 2023-04-29 NOTE — TELEPHONE ENCOUNTER
"Routing refill request to provider for review/approval because:  BP out of range    Last Written Prescription Date:  4/4/23  Last Fill Quantity: 30,  # refills: 0   Last office visit provider:  3/6/23     Requested Prescriptions   Pending Prescriptions Disp Refills     amLODIPine (NORVASC) 10 MG tablet 30 tablet 0     Sig: Take 0.5 tablets (5 mg) by mouth daily       Calcium Channel Blockers Protocol  Failed - 4/29/2023  6:34 AM        Failed - Blood pressure under 140/90 in past 12 months     BP Readings from Last 3 Encounters:   03/06/23 (!) 147/96   10/22/19 (!) 143/81   10/11/19 112/75                 Passed - Recent (12 mo) or future (30 days) visit within the authorizing provider's specialty     Patient has had an office visit with the authorizing provider or a provider within the authorizing providers department within the previous 12 mos or has a future within next 30 days. See \"Patient Info\" tab in inbasket, or \"Choose Columns\" in Meds & Orders section of the refill encounter.              Passed - Medication is active on med list        Passed - Patient is age 18 or older        Passed - No active pregnancy on record        Passed - Normal serum creatinine on file in past 12 months     Recent Labs   Lab Test 03/06/23  1645   CR 0.82       Ok to refill medication if creatinine is low          Passed - No positive pregnancy test in past 12 months             Brigitte Felipe RN 04/29/23 6:34 AM  "

## 2023-04-30 RX ORDER — AMLODIPINE BESYLATE 10 MG/1
5 TABLET ORAL DAILY
Qty: 30 TABLET | Refills: 0 | Status: SHIPPED | OUTPATIENT
Start: 2023-04-30 | End: 2023-06-02

## 2023-05-25 ENCOUNTER — TRANSFERRED RECORDS (OUTPATIENT)
Dept: HEALTH INFORMATION MANAGEMENT | Facility: CLINIC | Age: 48
End: 2023-05-25
Payer: COMMERCIAL

## 2023-06-01 DIAGNOSIS — I10 ESSENTIAL HYPERTENSION: ICD-10-CM

## 2023-06-02 RX ORDER — AMLODIPINE BESYLATE 10 MG/1
5 TABLET ORAL DAILY
Qty: 30 TABLET | Refills: 0 | Status: SHIPPED | OUTPATIENT
Start: 2023-06-02 | End: 2023-07-05

## 2023-06-02 NOTE — TELEPHONE ENCOUNTER
"Routing refill request to provider for review/approval because:  bp out of range    Last Written Prescription Date:  4/30/23  Last Fill Quantity: 30,  # refills: 0   Last office visit provider:  3/6/23     Requested Prescriptions   Pending Prescriptions Disp Refills     amLODIPine (NORVASC) 10 MG tablet 30 tablet 0     Sig: Take 0.5 tablets (5 mg) by mouth daily       Calcium Channel Blockers Protocol  Failed - 6/1/2023  6:04 PM        Failed - Blood pressure under 140/90 in past 12 months     BP Readings from Last 3 Encounters:   03/06/23 (!) 147/96   10/22/19 (!) 143/81   10/11/19 112/75                 Passed - Recent (12 mo) or future (30 days) visit within the authorizing provider's specialty     Patient has had an office visit with the authorizing provider or a provider within the authorizing providers department within the previous 12 mos or has a future within next 30 days. See \"Patient Info\" tab in inbasket, or \"Choose Columns\" in Meds & Orders section of the refill encounter.              Passed - Medication is active on med list        Passed - Patient is age 18 or older        Passed - No active pregnancy on record        Passed - Normal serum creatinine on file in past 12 months     Recent Labs   Lab Test 03/06/23  1645   CR 0.82       Ok to refill medication if creatinine is low          Passed - No positive pregnancy test in past 12 months             Nancy Dietz, RN 06/02/23 12:31 PM  "

## 2023-07-05 DIAGNOSIS — I10 ESSENTIAL HYPERTENSION: ICD-10-CM

## 2023-07-05 RX ORDER — AMLODIPINE BESYLATE 10 MG/1
5 TABLET ORAL DAILY
Qty: 30 TABLET | Refills: 0 | Status: SHIPPED | OUTPATIENT
Start: 2023-07-05 | End: 2023-08-03

## 2023-08-02 DIAGNOSIS — I10 ESSENTIAL HYPERTENSION: ICD-10-CM

## 2023-08-02 NOTE — TELEPHONE ENCOUNTER
"Routing refill request to provider for review/approval because:  BP out of range    Last Written Prescription Date:  7/05/2023  Last Fill Quantity: 30,  # refills: 0    Last office visit provider:  3/06/2023     Requested Prescriptions   Pending Prescriptions Disp Refills    amLODIPine (NORVASC) 10 MG tablet [Pharmacy Med Name: AMLODIPINE BESYLATE 10 MG TAB] 30 tablet 0     Sig: TAKE 1/2 TABLET BY MOUTH DAILY       Calcium Channel Blockers Protocol  Failed - 8/2/2023  3:16 PM        Failed - Blood pressure under 140/90 in past 12 months     BP Readings from Last 3 Encounters:   03/06/23 (!) 147/96   10/22/19 (!) 143/81   10/11/19 112/75                 Passed - Recent (12 mo) or future (30 days) visit within the authorizing provider's specialty     Patient has had an office visit with the authorizing provider or a provider within the authorizing providers department within the previous 12 mos or has a future within next 30 days. See \"Patient Info\" tab in inbasket, or \"Choose Columns\" in Meds & Orders section of the refill encounter.              Passed - Medication is active on med list        Passed - Patient is age 18 or older        Passed - No active pregnancy on record        Passed - Normal serum creatinine on file in past 12 months     Recent Labs   Lab Test 03/06/23  1645   CR 0.82       Ok to refill medication if creatinine is low          Passed - No positive pregnancy test in past 12 months             Pallavi Wolf RN 08/02/23 3:16 PM  "

## 2023-08-03 RX ORDER — AMLODIPINE BESYLATE 10 MG/1
TABLET ORAL
Qty: 30 TABLET | Refills: 0 | Status: SHIPPED | OUTPATIENT
Start: 2023-08-03 | End: 2023-09-01

## 2023-08-05 ENCOUNTER — HEALTH MAINTENANCE LETTER (OUTPATIENT)
Age: 48
End: 2023-08-05

## 2023-08-31 DIAGNOSIS — I10 ESSENTIAL HYPERTENSION: ICD-10-CM

## 2023-08-31 NOTE — TELEPHONE ENCOUNTER
"Routing refill request to provider for review/approval because:  Blood pressure failed    Last Written Prescription Date:  8/3/2023  Last Fill Quantity: 30,  # refills: 0   Last office visit provider:  3/6/2023     Requested Prescriptions   Pending Prescriptions Disp Refills    amLODIPine (NORVASC) 10 MG tablet [Pharmacy Med Name: AMLODIPINE BESYLATE 10 MG TAB] 30 tablet 0     Sig: TAKE 1/2 TABLET BY MOUTH DAILY       Calcium Channel Blockers Protocol  Failed - 8/31/2023 10:32 AM        Failed - Blood pressure under 140/90 in past 12 months     BP Readings from Last 3 Encounters:   03/06/23 (!) 147/96   10/22/19 (!) 143/81   10/11/19 112/75                 Passed - Recent (12 mo) or future (30 days) visit within the authorizing provider's specialty     Patient has had an office visit with the authorizing provider or a provider within the authorizing providers department within the previous 12 mos or has a future within next 30 days. See \"Patient Info\" tab in inbasket, or \"Choose Columns\" in Meds & Orders section of the refill encounter.              Passed - Medication is active on med list        Passed - Patient is age 18 or older        Passed - No active pregnancy on record        Passed - Normal serum creatinine on file in past 12 months     Recent Labs   Lab Test 03/06/23  1645   CR 0.82       Ok to refill medication if creatinine is low          Passed - No positive pregnancy test in past 12 months             Jocelin Griggs RN 08/31/23 1:52 PM  "

## 2023-09-01 RX ORDER — AMLODIPINE BESYLATE 10 MG/1
TABLET ORAL
Qty: 30 TABLET | Refills: 0 | Status: SHIPPED | OUTPATIENT
Start: 2023-09-01 | End: 2023-09-29

## 2023-09-29 DIAGNOSIS — I10 ESSENTIAL HYPERTENSION: ICD-10-CM

## 2023-10-02 RX ORDER — AMLODIPINE BESYLATE 10 MG/1
5 TABLET ORAL DAILY
Qty: 30 TABLET | Refills: 0 | Status: SHIPPED | OUTPATIENT
Start: 2023-10-02 | End: 2023-12-02

## 2023-10-13 ENCOUNTER — OFFICE VISIT (OUTPATIENT)
Dept: FAMILY MEDICINE | Facility: CLINIC | Age: 48
End: 2023-10-13
Payer: COMMERCIAL

## 2023-10-13 VITALS
HEART RATE: 80 BPM | BODY MASS INDEX: 28.45 KG/M2 | DIASTOLIC BLOOD PRESSURE: 91 MMHG | HEIGHT: 66 IN | SYSTOLIC BLOOD PRESSURE: 145 MMHG | TEMPERATURE: 98 F | OXYGEN SATURATION: 98 % | WEIGHT: 177 LBS

## 2023-10-13 DIAGNOSIS — R06.83 SNORES: ICD-10-CM

## 2023-10-13 DIAGNOSIS — F32.5 MAJOR DEPRESSIVE DISORDER, SINGLE EPISODE IN FULL REMISSION (H): Primary | ICD-10-CM

## 2023-10-13 DIAGNOSIS — F41.1 ANXIETY STATE: ICD-10-CM

## 2023-10-13 DIAGNOSIS — E66.3 OVERWEIGHT (BMI 25.0-29.9): ICD-10-CM

## 2023-10-13 DIAGNOSIS — I10 ESSENTIAL HYPERTENSION: ICD-10-CM

## 2023-10-13 PROCEDURE — 99214 OFFICE O/P EST MOD 30 MIN: CPT | Performed by: NURSE PRACTITIONER

## 2023-10-13 RX ORDER — DULOXETIN HYDROCHLORIDE 20 MG/1
20 CAPSULE, DELAYED RELEASE ORAL DAILY
COMMUNITY
Start: 2023-08-27

## 2023-10-13 RX ORDER — LOTEPREDNOL ETABONATE 5 MG/ML
SUSPENSION/ DROPS OPHTHALMIC
COMMUNITY
Start: 2023-02-08 | End: 2023-12-02

## 2023-10-13 RX ORDER — PREDNISOLONE ACETATE 10 MG/ML
SUSPENSION/ DROPS OPHTHALMIC
COMMUNITY
Start: 2023-03-23 | End: 2023-12-02

## 2023-10-13 ASSESSMENT — ANXIETY QUESTIONNAIRES
GAD7 TOTAL SCORE: 3
GAD7 TOTAL SCORE: 3
5. BEING SO RESTLESS THAT IT IS HARD TO SIT STILL: NOT AT ALL
3. WORRYING TOO MUCH ABOUT DIFFERENT THINGS: NOT AT ALL
4. TROUBLE RELAXING: SEVERAL DAYS
7. FEELING AFRAID AS IF SOMETHING AWFUL MIGHT HAPPEN: NOT AT ALL
2. NOT BEING ABLE TO STOP OR CONTROL WORRYING: SEVERAL DAYS
IF YOU CHECKED OFF ANY PROBLEMS ON THIS QUESTIONNAIRE, HOW DIFFICULT HAVE THESE PROBLEMS MADE IT FOR YOU TO DO YOUR WORK, TAKE CARE OF THINGS AT HOME, OR GET ALONG WITH OTHER PEOPLE: SOMEWHAT DIFFICULT
6. BECOMING EASILY ANNOYED OR IRRITABLE: NOT AT ALL
1. FEELING NERVOUS, ANXIOUS, OR ON EDGE: SEVERAL DAYS

## 2023-10-13 ASSESSMENT — PATIENT HEALTH QUESTIONNAIRE - PHQ9
SUM OF ALL RESPONSES TO PHQ QUESTIONS 1-9: 2
10. IF YOU CHECKED OFF ANY PROBLEMS, HOW DIFFICULT HAVE THESE PROBLEMS MADE IT FOR YOU TO DO YOUR WORK, TAKE CARE OF THINGS AT HOME, OR GET ALONG WITH OTHER PEOPLE: NOT DIFFICULT AT ALL
SUM OF ALL RESPONSES TO PHQ QUESTIONS 1-9: 2

## 2023-10-13 ASSESSMENT — PAIN SCALES - GENERAL: PAINLEVEL: NO PAIN (0)

## 2023-10-13 NOTE — PROGRESS NOTES
"  Assessment & Plan     Major Depression, Single Episode In Remission    Anxiety    Essential hypertension    - lisinopril (ZESTRIL) 10 MG tablet  Dispense: 30 tablet; Refill: 3  - amLODIPine (NORVASC) 5 MG tablet  Dispense: 90 tablet; Refill: 3    Overweight (BMI 25.0-29.9)      Snores      - we spent time discussing diet and the importance of maintaining a good weight to help prevent co morbidities and aide in lowering BP. I discussed Wegovy and the backorder of supplies right now for this medication, especially at the lower doses. I suggest she try Metformin or Wellbutrin (she declined Topamax). She will talk with therapist about the possibility of trying wellbutrin. This med will aide in lowering cravings. Metformin will aide in lowering insulin resistance and make the GLP1 more sensitive which can aide in weight loss. She did not like that idea right now.   - I encouraged her to keep on working out and making good choices with her diet. Lose it Chivo discussed.   - BP elevated. Will add on Lisinopril 10 mg. Please let me know about BP readings via my chart in two weeks.   - sleep apnea and snoring discussed             BMI:   Estimated body mass index is 28.38 kg/m  as calculated from the following:    Height as of this encounter: 1.682 m (5' 6.22\").    Weight as of this encounter: 80.3 kg (177 lb).   Weight management plan: Discussed healthy diet and exercise guidelines        NANCY Durham CNP  [facility] LIANA Ray is a 47 year old, presenting for the following health issues:  Hypertension (Blood pressure follow up, stress at home from daughter's anxiety. Has been taking 1/2 pill, sometimes 1 pill when things are stressful. Averaging 130/ at home blood pressure) and Weight Loss (Concerns about trouble losing weight)    - 133/93 and drinks decaf coffee. 139/83, 154/101, 154/94 BP readings at home. Has some anxiety. See's psych and he does not want to " "increase Cymbalta or add any new medications. Her stress with daughter is getting better. Denied SI or HI. Mainly only taking 5 mg of Norvasc.   She is a dietitian. Works out on a regular basis: 5 minute workout, 6 days a week, fast walking. Diet; intermittent fasting five days a week 730-11a, 2 meals a day, snacks but feels her diet is healthy, drinks wine intermittently throughout the week. Some of her friends are on Wegovy and lost lots of weight.    She does snore but does not want to get a sleep study at this time.         10/13/2023    10:52 AM   Additional Questions   Roomed by [facility], Visit Facilitator       History of Present Illness       Hypertension: She presents for follow up of hypertension.  She does check blood pressure  regularly outside of the clinic. Outside blood pressures have been over 140/90. She follows a low salt diet.     She eats 2-3 servings of fruits and vegetables daily.She consumes 0 sweetened beverage(s) daily.She exercises with enough effort to increase her heart rate 60 or more minutes per day.  She exercises with enough effort to increase her heart rate 6 days per week.   She is taking medications regularly.       Review of Systems   Constitutional, HEENT, cardiovascular, pulmonary, gi and gu systems are negative, except as otherwise noted.      Objective    BP (!) 145/91 (BP Location: Left arm, Patient Position: Sitting, Cuff Size: Adult Regular)   Pulse 80   Temp 98  F (36.7  C) (Oral)   Ht 1.682 m (5' 6.22\")   Wt 80.3 kg (177 lb)   SpO2 98%   BMI 28.38 kg/m    Body mass index is 28.38 kg/m .  Physical Exam   GENERAL: healthy, alert and no distress  MS: no gross musculoskeletal defects noted, no edema  PSYCH: mentation appears normal, affect normal/bright    Office Visit on 03/06/2023   Component Date Value Ref Range Status    Trichomonas 03/06/2023 Absent  Absent Final    Yeast 03/06/2023 Absent  Absent Final    Clue Cells 03/06/2023 Absent  Absent Final    WBCs/high " power field 03/06/2023 3+ (A)  None Final    Cholesterol 03/06/2023 220 (H)  <200 mg/dL Final    Triglycerides 03/06/2023 155 (H)  <150 mg/dL Final    Direct Measure HDL 03/06/2023 68  >=50 mg/dL Final    LDL Cholesterol Calculated 03/06/2023 121 (H)  <=100 mg/dL Final    Non HDL Cholesterol 03/06/2023 152 (H)  <130 mg/dL Final    Sodium 03/06/2023 140  136 - 145 mmol/L Final    Potassium 03/06/2023 3.9  3.4 - 5.3 mmol/L Final    Chloride 03/06/2023 103  98 - 107 mmol/L Final    Carbon Dioxide (CO2) 03/06/2023 26  22 - 29 mmol/L Final    Anion Gap 03/06/2023 11  7 - 15 mmol/L Final    Urea Nitrogen 03/06/2023 13.6  6.0 - 20.0 mg/dL Final    Creatinine 03/06/2023 0.82  0.51 - 0.95 mg/dL Final    Calcium 03/06/2023 9.2  8.6 - 10.0 mg/dL Final    Glucose 03/06/2023 88  70 - 99 mg/dL Final    Alkaline Phosphatase 03/06/2023 61  35 - 104 U/L Final    AST 03/06/2023 22  10 - 35 U/L Final    ALT 03/06/2023 10  10 - 35 U/L Final    Protein Total 03/06/2023 7.4  6.4 - 8.3 g/dL Final    Albumin 03/06/2023 4.3  3.5 - 5.2 g/dL Final    Bilirubin Total 03/06/2023 0.3  <=1.2 mg/dL Final    GFR Estimate 03/06/2023 89  >60 mL/min/1.73m2 Final    eGFR calculated using 2021 CKD-EPI equation.    Hemoglobin A1C 03/06/2023 5.2  0.0 - 5.6 % Final    Normal <5.7%   Prediabetes 5.7-6.4%    Diabetes 6.5% or higher     Note: Adopted from ADA consensus guidelines.

## 2023-10-13 NOTE — PATIENT INSTRUCTIONS
"    Consider keeping a food diary (i.e. My Fitness Pal, Lose It, or other food tracker). Try to use it 24 hours, every day for at least one week. This will help with accountability.  Start Weighing yourself every day, this will aide in keeping you accountable. If it makes you anxious, you can skip this step.      Nutrition Goals  1) Follow 8992-5883 calorie/day plan; can use THEMA lizzy to help with diary.               -www.eatthismuch.com     2) 9\" Plate method (1/2 plate non-starchy vegetables/fruit, 1/4 plate lean protein, 1/4 plate whole grain starch - no more than 1/2 cup carb/meal).     3).Eat 3 meals a day (not 2, not 5) Chew your food well/SLOW down    4) Eat your protein first, this will aide in filling you up. Eat Wheat, not white (bread, pasta, crackers, enriqueta, bagels, tortillas, rice)    ---Can aim for 80 grams of lean protein daily should be effective for you and allow you to have good control over appetite if you start getting a protein rich meal with 25-30 grams of protein every 4.5-6 hours through the day. This protein anchor to the meal should be bulked up with some good vegetables/greens for the fiber/crunch/chew and benefit on your blood pressure.  Get the protein portion of your meal at the beginning of the meal as your appetite, especially if you are on appetite suppressant/diabetes medication. Appetite suppressants can cause fullness and we want to make sure you get at least 25 grams of protein at each of your 3 meals daily to support good metabolism and lean muscle mass.      5). Be a water drinker/Minize liquid calories (no regular pop, no juice) skim or 1% milk OK  6) Sleep 7-8 hours each night. Address sleep if problematic  7) Stress management is important. Address if problematic  8) Limit restaurant, cafeteria, take out, drive through to 2 times per week or less  9) Minimize caffeine, alcohol, and night-time snacking    -Follow up with the dietitian    **Some lean proteins: chicken, " turkey, tuna, salmon, crab, fish, shrimp, scallops, lobster, lean cuts of beef and pork, luncheon meats, veggie burgers, beans (black, lima, garbanzo, sheridan, kidney, refried), chile, cottage cheese, string cheese, other cheese, eggs, tofu, peanut butter, nuts, vegan crumbles, greek yogurt      --- Continue physical activity; can start with 10 minutes per day like going for a walk after dinner or at your lunchtime. Goal is 8000 steps daily. For the Muscle: maintain your muscle mass (strength training 2X/wk)    Meal prep tips: https://www.Original.Stellar/healthyeats/healthy-tips/2019/10/meal-prep-tips-arvin-experts    Avoid snacking as able. If snack is needed use lean protein and/or fruit/vegetable. Examples:              - 2 cup popcorn              - 1 cup mixed berries              - 15 almonds, walnuts, cashews              - carrot/celery sticks and 2 tbsp low-fat ranch              - 1 hard boiled egg              - Part-skim mozzarella cheese stick              - Low-fat, low-sugar greek yogurt with 1/2 cup berries              - Med apple or pear              - sliced bell peppers with 1/2 cup salsa              - 1/2 cup roasted chickpeas              - sliced cucumbers with vinegar     100 calorie sweets: Smart Sweets, Fiber One desserts, Fit and Active 100 calorie snack sweets at [employer]; [employer] 100 calorie pre-portioned cookies, sugar-free pudding, sugar-free jello.     Snack Recipes:  - Banana and creamy PB dip (https://www.diabetesfoodhub.org/recipes/sweet-peanut-buttery-dip.html?home-category_id=23)  - Roasted chickpeas (https://www.diabetesfoodhub.org/recipes/roasted-and-spiced-chickpeas.html?home-category_id=23)  - Lemon Raspberry destiney seed pudding (https://www.diabetesfoodhub.org/recipes/lemon-raspberry- destiney-seed-pudding.html?home-category_id=23)  - Black bean hummus with carrot and celery sticks (https://www.diabetesfoodhub.org/recipes/black-bean-hummus.html?home-category_id=23)  - Greek yogurt  "chocolate mouse (https://www.diabetesfoodhub.org/recipes/greek-yogurt-chocolate-mousse.html?home-category_id=23)   - Broccoli Cheese Bites  (https://www.diabetesfoodhub.org/recipes/broccoli-cheese-bites.html?home-category_id=20)  - Chicken Satay with peanut sauce  (https://www.diabetesfoodhub.org/recipes/blueberry-almond-chicken-salad-lettuce-wraps.html?home-category_id=20)  - Deviled Eggs  (https://www.diabetesfoodhub.org/recipes/devilled-eggs.html?home-category_id=20)     Healthy Recipe Resources:  Books:  \"The Volumetrics Eating Plan\" by Jimena Tian, Ph.D.  \"Cooking that Counts\" by editors of Giftiki  \"Calorie Smart Meals\" cookbook by [employer] (200-500 calorie meals)     Websites:  www.Sea's Food Cafe  www.Meituan.com  https://www.diabetesfoodhub.org/all-recipes.html  https://www.InsightSquared.[employer]/  Https://www.Contacts+myplate.gov/myplatekitchen  https://snaped.fns.usda.gov/recipes-menus   https://www.Pocket Social/gallery/6569879/dietitian-budget-high-protein-dinner-recipes/  https://www.PAIEON.[employer]/recipes/84/healthy-recipes/         Cultural Cuisines:  https://www.Zmags.[employer]/recipes/46692/cuisines-regions//  https://www.firstnations.org/knowledge-center/recipes/  https://KeepFu.[employer]/recipe-index/     Apps:  MealGruppo MutuiOnline lizzy (or website, mealime.com)   Lianna         The Plate Method  Http://www.PeekYou/545746.pdf     Protein Sources   http://PeekYou/967994.pdf      Carbohydrates  http://fvfiles.com/351672.pdf      Mindful Eating  http://PeekYou/952304.pdf      Summary of Volumetrics Eating Plan  http://fvfiles.com/220101.pdf     "

## 2023-10-16 RX ORDER — AMLODIPINE BESYLATE 5 MG/1
5 TABLET ORAL DAILY
Qty: 90 TABLET | Refills: 3 | Status: SHIPPED | OUTPATIENT
Start: 2023-10-16

## 2023-10-16 RX ORDER — LISINOPRIL 10 MG/1
10 TABLET ORAL DAILY
Qty: 30 TABLET | Refills: 3 | Status: SHIPPED | OUTPATIENT
Start: 2023-10-16 | End: 2023-12-02

## 2023-11-09 ENCOUNTER — TRANSFERRED RECORDS (OUTPATIENT)
Dept: HEALTH INFORMATION MANAGEMENT | Facility: CLINIC | Age: 48
End: 2023-11-09
Payer: COMMERCIAL

## 2023-11-30 DIAGNOSIS — I10 ESSENTIAL HYPERTENSION: ICD-10-CM

## 2023-12-01 NOTE — TELEPHONE ENCOUNTER
Left VM for pt.     ANY RN- ask pt how their BP's are since adding lisinopril on 10/16/2023.    Send message to PCP for review after information is collected.    Anne Britt RN

## 2023-12-02 ENCOUNTER — OFFICE VISIT (OUTPATIENT)
Dept: FAMILY MEDICINE | Facility: CLINIC | Age: 48
End: 2023-12-02
Payer: COMMERCIAL

## 2023-12-02 VITALS
DIASTOLIC BLOOD PRESSURE: 86 MMHG | RESPIRATION RATE: 16 BRPM | TEMPERATURE: 98 F | OXYGEN SATURATION: 99 % | WEIGHT: 176.4 LBS | BODY MASS INDEX: 28.28 KG/M2 | HEART RATE: 85 BPM | SYSTOLIC BLOOD PRESSURE: 137 MMHG

## 2023-12-02 DIAGNOSIS — Z86.19 HISTORY OF CANDIDIASIS OF VAGINA: ICD-10-CM

## 2023-12-02 DIAGNOSIS — L03.115 CELLULITIS OF RIGHT ANKLE: Primary | ICD-10-CM

## 2023-12-02 PROBLEM — K62.1 ANORECTAL POLYP: Status: ACTIVE | Noted: 2023-12-02

## 2023-12-02 PROBLEM — K50.90 REGIONAL ENTERITIS (H): Status: ACTIVE | Noted: 2023-12-02

## 2023-12-02 PROBLEM — K62.0 ANORECTAL POLYP: Status: ACTIVE | Noted: 2023-12-02

## 2023-12-02 PROBLEM — F32.5 MAJOR DEPRESSIVE DISORDER, SINGLE EPISODE IN FULL REMISSION (H): Status: ACTIVE | Noted: 2023-12-02

## 2023-12-02 PROBLEM — F41.1 ANXIETY STATE: Status: ACTIVE | Noted: 2023-12-02

## 2023-12-02 PROCEDURE — 99213 OFFICE O/P EST LOW 20 MIN: CPT | Performed by: PHYSICIAN ASSISTANT

## 2023-12-02 RX ORDER — FLUCONAZOLE 150 MG/1
150 TABLET ORAL ONCE
Qty: 1 TABLET | Refills: 0 | Status: SHIPPED | OUTPATIENT
Start: 2023-12-02 | End: 2023-12-02

## 2023-12-02 ASSESSMENT — ENCOUNTER SYMPTOMS
FEVER: 1
COLOR CHANGE: 1
SORE THROAT: 1

## 2023-12-02 NOTE — PROGRESS NOTES
Assessment & Plan       ICD-10-CM    1. Cellulitis of right ankle  L03.115 amoxicillin-clavulanate (AUGMENTIN) 875-125 MG tablet      2. History of candidiasis of vagina  Z86.19 fluconazole (DIFLUCAN) 150 MG tablet         Erythema, warmth to touch, history of fever and PE suggest cellulitis. Will treat with the above therapy. She also reports history of yeast infections, Diflucan sent in. You have been diagnosed with Cellulitis which is a superficial infection of the skin.   -Complete antibiotics as ordered. Take with food to help prevent stomach upset.  -Elevate the affected limb as much as possible. This will help keep the swelling down.  -Wash your hands before and after touching any cuts, scratches, or bandages to prevent infections.  -Keep the infected area clean; warm water soak with mild soap for 10-15 minutes 3 times a day. Pat dry.  -Leaving open to air is best, unless area is rubbing or at risk for getting dirty.  -Tylenol or Ibuprofen as needed for discomfort  -If develop a fever, increased redness, pain, drainage or swelling from the area, should follow up for reevaluation.  -Should see improvement in next 2-3 days after starting antibiotics. Follow up if no improvement      Follow up with primary care provider with any problems, questions or concerns or if symptoms worsen or fail to improve. Patient agreed to plan and verbalized understanding.     Cathy Ray is a 48 year old female who presents to clinic today for the following health issues:  Chief Complaint   Patient presents with    Cellulitis     Possible cellulitis on rt foot it is swelling and red, not painful it is itchy and she gets cracks on the bottom of heal and her ankle is swelling as well. This started yesterday      Flor presents with reports of right ankle swelling and redness x 24 hours. She reports history of callous cracks as well as history of possible strep last week with tonsillar swelling and white exudates.              Review of Systems   Constitutional:  Positive for fever.   HENT:  Positive for sore throat.    Skin:  Positive for color change.       Problem List:  Crohn's Disease  Rectal Polyps  Major Depression, Single Episode In Remission  Anxiety      No past medical history on file.    Social History     Tobacco Use    Smoking status: Never    Smokeless tobacco: Never   Substance Use Topics    Alcohol use: Not on file           Objective    /86   Pulse 85   Temp 98  F (36.7  C) (Oral)   Resp 16   Wt 80 kg (176 lb 6.4 oz)   SpO2 99%   BMI 28.28 kg/m    Physical Exam  Constitutional:       Appearance: Normal appearance.   HENT:      Head: Normocephalic and atraumatic.      Mouth/Throat:      Pharynx: Oropharyngeal exudate and posterior oropharyngeal erythema present.   Musculoskeletal:      Cervical back: Normal range of motion and neck supple.   Skin:     Comments: Right ankle edematous with erythema that is demarcated, warmth to touch   Neurological:      General: No focal deficit present.      Mental Status: She is alert and oriented to person, place, and time.   Psychiatric:         Mood and Affect: Mood normal.         Behavior: Behavior normal.         Thought Content: Thought content normal.         Judgment: Judgment normal.              Panchito Cueva PA-C

## 2023-12-02 NOTE — PATIENT INSTRUCTIONS
You have been diagnosed with Cellulitis which is a superficial infection of the skin.   -Complete antibiotics as ordered. Take with food to help prevent stomach upset.  -Elevate the affected limb as much as possible. This will help keep the swelling down.  -Wash your hands before and after touching any cuts, scratches, or bandages to prevent infections.  -Keep the infected area clean; warm water soak with mild soap for 10-15 minutes 3 times a day. Pat dry.  -Leaving open to air is best, unless area is rubbing or at risk for getting dirty.  -Tylenol or Ibuprofen as needed for discomfort  -If develop a fever, increased redness, pain, drainage or swelling from the area, should follow up for reevaluation.  -Should see improvement in next 2-3 days after starting antibiotics. Follow up if no improvement

## 2023-12-04 RX ORDER — LISINOPRIL 10 MG/1
10 TABLET ORAL DAILY
Qty: 90 TABLET | Refills: 3 | Status: SHIPPED | OUTPATIENT
Start: 2023-12-04

## 2024-09-28 ENCOUNTER — HEALTH MAINTENANCE LETTER (OUTPATIENT)
Age: 49
End: 2024-09-28

## 2024-10-25 DIAGNOSIS — I10 ESSENTIAL HYPERTENSION: ICD-10-CM

## 2024-10-25 RX ORDER — AMLODIPINE BESYLATE 5 MG/1
5 TABLET ORAL DAILY
Qty: 90 TABLET | Refills: 0 | Status: SHIPPED | OUTPATIENT
Start: 2024-10-25

## 2025-01-20 DIAGNOSIS — I10 ESSENTIAL HYPERTENSION: ICD-10-CM

## 2025-01-20 RX ORDER — AMLODIPINE BESYLATE 5 MG/1
5 TABLET ORAL DAILY
Qty: 30 TABLET | Refills: 0 | Status: SHIPPED | OUTPATIENT
Start: 2025-01-20

## 2025-02-22 DIAGNOSIS — I10 ESSENTIAL HYPERTENSION: ICD-10-CM

## 2025-02-24 RX ORDER — AMLODIPINE BESYLATE 5 MG/1
5 TABLET ORAL DAILY
Qty: 30 TABLET | Refills: 0 | Status: SHIPPED | OUTPATIENT
Start: 2025-02-24

## 2025-04-04 DIAGNOSIS — I10 ESSENTIAL HYPERTENSION: ICD-10-CM

## 2025-04-04 NOTE — TELEPHONE ENCOUNTER
4-4-25  Patient Quality Outreach    Patient is due for the following:   Physical Preventive Adult Physical & med check    Action(s) Taken:   Schedule a Adult Preventative    Type of outreach:    Phone, left message for patient/parent to call back.    Questions for provider review:    None         Laura Collins  Chart routed to None.

## 2025-04-04 NOTE — TELEPHONE ENCOUNTER
Routing refill request to [facility] because:  Patient needs to be seen because it has been more than 1 year since last office visit. Last office visit: 10/13/2023.    Anne Britt RN

## 2025-04-07 RX ORDER — AMLODIPINE BESYLATE 5 MG/1
5 TABLET ORAL DAILY
Qty: 90 TABLET | Refills: 1 | OUTPATIENT
Start: 2025-04-07

## 2025-04-07 NOTE — TELEPHONE ENCOUNTER
04/07/25  Patient Quality Outreach    Patient is due for the following:   Physical Preventive Adult Physical    Action(s) Taken:   Schedule a Adult Preventative    Type of outreach:    Phone, left message for patient/parent to call back. and Sent Citilog message.    Questions for provider review:    None         Tamika Cary  Chart routed to None.

## 2025-04-15 ENCOUNTER — LAB (OUTPATIENT)
Dept: LAB | Facility: CLINIC | Age: 50
End: 2025-04-15
Payer: COMMERCIAL

## 2025-04-15 DIAGNOSIS — J02.9 SORE THROAT: ICD-10-CM

## 2025-04-15 LAB
DEPRECATED S PYO AG THROAT QL EIA: NEGATIVE
S PYO DNA THROAT QL NAA+PROBE: NOT DETECTED

## 2025-04-15 PROCEDURE — 87651 STREP A DNA AMP PROBE: CPT

## 2025-04-16 ENCOUNTER — VIRTUAL VISIT (OUTPATIENT)
Dept: FAMILY MEDICINE | Facility: CLINIC | Age: 50
End: 2025-04-16
Payer: COMMERCIAL

## 2025-04-16 DIAGNOSIS — I10 ESSENTIAL HYPERTENSION: ICD-10-CM

## 2025-04-16 DIAGNOSIS — F32.5 MAJOR DEPRESSIVE DISORDER, SINGLE EPISODE IN FULL REMISSION: ICD-10-CM

## 2025-04-16 DIAGNOSIS — F41.1 ANXIETY STATE: Primary | ICD-10-CM

## 2025-04-16 PROCEDURE — 98005 SYNCH AUDIO-VIDEO EST LOW 20: CPT | Performed by: NURSE PRACTITIONER

## 2025-04-16 PROCEDURE — 1126F AMNT PAIN NOTED NONE PRSNT: CPT | Mod: 95 | Performed by: NURSE PRACTITIONER

## 2025-04-16 RX ORDER — AMLODIPINE BESYLATE 5 MG/1
5 TABLET ORAL DAILY
Qty: 90 TABLET | Refills: 4 | Status: SHIPPED | OUTPATIENT
Start: 2025-04-16

## 2025-04-16 ASSESSMENT — PATIENT HEALTH QUESTIONNAIRE - PHQ9
10. IF YOU CHECKED OFF ANY PROBLEMS, HOW DIFFICULT HAVE THESE PROBLEMS MADE IT FOR YOU TO DO YOUR WORK, TAKE CARE OF THINGS AT HOME, OR GET ALONG WITH OTHER PEOPLE: NOT DIFFICULT AT ALL
SUM OF ALL RESPONSES TO PHQ QUESTIONS 1-9: 1
SUM OF ALL RESPONSES TO PHQ QUESTIONS 1-9: 1

## 2025-04-16 NOTE — PROGRESS NOTES
Flor is a 49 year old who is being evaluated via a billable video visit.    How would you like to obtain your AVS? MyChart  If the video visit is dropped, the invitation should be resent by: Text to cell phone: [phone]  Will anyone else be joining your video visit? No      Assessment & Plan     Essential hypertension  *  - amLODIPine (NORVASC) 5 MG tablet  Dispense: 90 tablet; Refill: 4    Anxiety  **    Major depressive disorder, single episode in full remission  **    - BP checked this morning. Stable. Did not start lisinopril. Norvasc refilled. Pap done 2024 at [facility]. Due for labs at next visit.   - anxiety appears stable on Cymbalta. She follows psychiatry and therapy.       Follow-up    Follow-up Visit   Expected date:  Apr 16, 2026 (Approximate)      Follow Up Appointment Details:     Follow-up with whom?: Me    Follow-Up for what?: Adult Preventive    Any Additional Chronic Condition Management?:  Hypertension  Depression  Anxiety       How?: In Person                 Subjective   Flor is a 49 year old, presenting for the following health issues:  Recheck Medication    - doing well. Did not start ACE. On Norvasc. No side effects. Works on diet and exercise. Does not eat dairy. Feels her mental health is stable.         10/13/2023    10:53 AM   Additional Questions   Roomed by Shalom Soto, Visit Facilitator     History of Present Illness       Hypertension: She presents for follow up of hypertension.  She does not check blood pressure  regularly outside of the clinic. Outside blood pressures have been over 140/90. She does not follow a low salt diet.     She eats 2-3 servings of fruits and vegetables daily.She consumes 0 sweetened beverage(s) daily.She exercises with enough effort to increase her heart rate 30 to 60 minutes per day.  She exercises with enough effort to increase her heart rate 6 days per week.   She is taking medications regularly.          Objective    Vitals - Patient  Reported  Systolic (Patient Reported): 130  Diastolic (Patient Reported): 89  Weight (Patient Reported): 77.1 kg (170 lb)  Pain Score: No Pain (0)        Physical Exam   GENERAL: alert and no distress  EYES: Eyes grossly normal to inspection.  No discharge or erythema, or obvious scleral/conjunctival abnormalities.  RESP: No audible wheeze, cough, or visible cyanosis.    SKIN: Visible skin clear. No significant rash, abnormal pigmentation or lesions.  NEURO: Cranial nerves grossly intact.  Mentation and speech appropriate for age.  PSYCH: Appropriate affect, tone, and pace of words    Lab on 04/15/2025   Component Date Value Ref Range Status    Group A Strep antigen 04/15/2025 Negative  Negative Final    Group A strep by PCR 04/15/2025 Not Detected  Not Detected Final         Video-Visit Details    Type of service:  Video Visit   Originating Location (pt. Location): Home    Distant Location (provider location):  Off-site  Platform used for Video Visit: Héctor  Signed Electronically by: NANCY Durham CNP

## 2025-04-22 ENCOUNTER — HOSPITAL ENCOUNTER (OUTPATIENT)
Dept: MAMMOGRAPHY | Facility: CLINIC | Age: 50
Discharge: HOME | End: 2025-04-22
Attending: NURSE PRACTITIONER | Admitting: NURSE PRACTITIONER
Payer: COMMERCIAL

## 2025-04-22 DIAGNOSIS — Z12.31 VISIT FOR SCREENING MAMMOGRAM: ICD-10-CM

## 2025-04-22 PROCEDURE — 77063 BREAST TOMOSYNTHESIS BI: CPT

## 2025-07-01 ENCOUNTER — APPOINTMENT (OUTPATIENT)
Dept: MRI IMAGING | Facility: CLINIC | Age: 50
End: 2025-07-01
Attending: EMERGENCY MEDICINE
Payer: COMMERCIAL

## 2025-07-01 ENCOUNTER — OFFICE VISIT (OUTPATIENT)
Dept: URGENT CARE | Facility: URGENT CARE | Age: 50
End: 2025-07-01
Payer: COMMERCIAL

## 2025-07-01 ENCOUNTER — HOSPITAL ENCOUNTER (EMERGENCY)
Facility: CLINIC | Age: 50
Discharge: HOME | End: 2025-07-01
Attending: EMERGENCY MEDICINE | Admitting: EMERGENCY MEDICINE
Payer: COMMERCIAL

## 2025-07-01 VITALS
OXYGEN SATURATION: 100 % | RESPIRATION RATE: 17 BRPM | DIASTOLIC BLOOD PRESSURE: 95 MMHG | HEART RATE: 70 BPM | SYSTOLIC BLOOD PRESSURE: 159 MMHG

## 2025-07-01 VITALS
OXYGEN SATURATION: 97 % | RESPIRATION RATE: 18 BRPM | DIASTOLIC BLOOD PRESSURE: 93 MMHG | BODY MASS INDEX: 28.54 KG/M2 | WEIGHT: 178 LBS | TEMPERATURE: 98.3 F | HEART RATE: 78 BPM | SYSTOLIC BLOOD PRESSURE: 156 MMHG

## 2025-07-01 DIAGNOSIS — R27.0 ATAXIA: ICD-10-CM

## 2025-07-01 DIAGNOSIS — R29.898 LEFT ARM WEAKNESS: ICD-10-CM

## 2025-07-01 DIAGNOSIS — G43.809 OTHER MIGRAINE WITHOUT STATUS MIGRAINOSUS, NOT INTRACTABLE: ICD-10-CM

## 2025-07-01 DIAGNOSIS — E78.2 MIXED HYPERLIPIDEMIA: ICD-10-CM

## 2025-07-01 DIAGNOSIS — R51.9 ACUTE NONINTRACTABLE HEADACHE, UNSPECIFIED HEADACHE TYPE: Primary | ICD-10-CM

## 2025-07-01 DIAGNOSIS — I10 BENIGN ESSENTIAL HYPERTENSION: ICD-10-CM

## 2025-07-01 LAB
ANION GAP SERPL CALCULATED.3IONS-SCNC: 12 MMOL/L (ref 7–15)
APTT PPP: 25 SECONDS (ref 22–38)
ATRIAL RATE - MUSE: 75 BPM
BASOPHILS # BLD AUTO: 0.1 10E3/UL (ref 0–0.2)
BASOPHILS NFR BLD AUTO: 1 %
BUN SERPL-MCNC: 12.9 MG/DL (ref 6–20)
CALCIUM SERPL-MCNC: 9 MG/DL (ref 8.8–10.4)
CHLORIDE SERPL-SCNC: 102 MMOL/L (ref 98–107)
CREAT SERPL-MCNC: 0.73 MG/DL (ref 0.51–0.95)
DIASTOLIC BLOOD PRESSURE - MUSE: NORMAL MMHG
EGFRCR SERPLBLD CKD-EPI 2021: >90 ML/MIN/1.73M2
EOSINOPHIL # BLD AUTO: 0.2 10E3/UL (ref 0–0.7)
EOSINOPHIL NFR BLD AUTO: 2 %
ERYTHROCYTE [DISTWIDTH] IN BLOOD BY AUTOMATED COUNT: 12.8 % (ref 10–15)
GLUCOSE BLDC GLUCOMTR-MCNC: 84 MG/DL (ref 70–99)
GLUCOSE SERPL-MCNC: 84 MG/DL (ref 70–99)
HCO3 SERPL-SCNC: 23 MMOL/L (ref 22–29)
HCT VFR BLD AUTO: 39.1 % (ref 35–47)
HGB BLD-MCNC: 13.7 G/DL (ref 11.7–15.7)
IMM GRANULOCYTES # BLD: 0 10E3/UL
IMM GRANULOCYTES NFR BLD: 0 %
INR PPP: 0.97 (ref 0.85–1.15)
INTERPRETATION ECG - MUSE: NORMAL
LYMPHOCYTES # BLD AUTO: 1.5 10E3/UL (ref 0.8–5.3)
LYMPHOCYTES NFR BLD AUTO: 20 %
MCH RBC QN AUTO: 31.1 PG (ref 26.5–33)
MCHC RBC AUTO-ENTMCNC: 35 G/DL (ref 31.5–36.5)
MCV RBC AUTO: 89 FL (ref 78–100)
MONOCYTES # BLD AUTO: 0.7 10E3/UL (ref 0–1.3)
MONOCYTES NFR BLD AUTO: 10 %
NEUTROPHILS # BLD AUTO: 4.8 10E3/UL (ref 1.6–8.3)
NEUTROPHILS NFR BLD AUTO: 67 %
NRBC # BLD AUTO: 0 10E3/UL
NRBC BLD AUTO-RTO: 0 /100
P AXIS - MUSE: 40 DEGREES
PLATELET # BLD AUTO: 374 10E3/UL (ref 150–450)
POTASSIUM SERPL-SCNC: 3.7 MMOL/L (ref 3.4–5.3)
PR INTERVAL - MUSE: 146 MS
PROTHROMBIN TIME: 13.1 SECONDS (ref 11.8–14.8)
QRS DURATION - MUSE: 90 MS
QT - MUSE: 392 MS
QTC - MUSE: 437 MS
R AXIS - MUSE: -8 DEGREES
RBC # BLD AUTO: 4.41 10E6/UL (ref 3.8–5.2)
SODIUM SERPL-SCNC: 137 MMOL/L (ref 135–145)
SYSTOLIC BLOOD PRESSURE - MUSE: NORMAL MMHG
T AXIS - MUSE: 6 DEGREES
TROPONIN T SERPL HS-MCNC: <6 NG/L
VENTRICULAR RATE- MUSE: 75 BPM
WBC # BLD AUTO: 7.2 10E3/UL (ref 4–11)

## 2025-07-01 PROCEDURE — 99214 OFFICE O/P EST MOD 30 MIN: CPT | Performed by: PHYSICIAN ASSISTANT

## 2025-07-01 PROCEDURE — 3080F DIAST BP >= 90 MM HG: CPT | Performed by: PHYSICIAN ASSISTANT

## 2025-07-01 PROCEDURE — 3077F SYST BP >= 140 MM HG: CPT | Performed by: PHYSICIAN ASSISTANT

## 2025-07-01 PROCEDURE — 85004 AUTOMATED DIFF WBC COUNT: CPT | Performed by: EMERGENCY MEDICINE

## 2025-07-01 PROCEDURE — 70544 MR ANGIOGRAPHY HEAD W/O DYE: CPT | Mod: XU

## 2025-07-01 PROCEDURE — A9585 GADOBUTROL INJECTION: HCPCS | Performed by: EMERGENCY MEDICINE

## 2025-07-01 PROCEDURE — 84484 ASSAY OF TROPONIN QUANT: CPT | Performed by: EMERGENCY MEDICINE

## 2025-07-01 PROCEDURE — 99285 EMERGENCY DEPT VISIT HI MDM: CPT | Mod: 25

## 2025-07-01 PROCEDURE — 70553 MRI BRAIN STEM W/O & W/DYE: CPT

## 2025-07-01 PROCEDURE — 36415 COLL VENOUS BLD VENIPUNCTURE: CPT | Performed by: EMERGENCY MEDICINE

## 2025-07-01 PROCEDURE — 82962 GLUCOSE BLOOD TEST: CPT

## 2025-07-01 PROCEDURE — 80048 BASIC METABOLIC PNL TOTAL CA: CPT | Performed by: EMERGENCY MEDICINE

## 2025-07-01 PROCEDURE — 255N000002 HC RX 255 OP 636: Performed by: EMERGENCY MEDICINE

## 2025-07-01 PROCEDURE — 93005 ELECTROCARDIOGRAM TRACING: CPT | Performed by: EMERGENCY MEDICINE

## 2025-07-01 PROCEDURE — 85730 THROMBOPLASTIN TIME PARTIAL: CPT | Performed by: EMERGENCY MEDICINE

## 2025-07-01 PROCEDURE — 85610 PROTHROMBIN TIME: CPT | Performed by: EMERGENCY MEDICINE

## 2025-07-01 RX ORDER — GADOBUTROL 604.72 MG/ML
8 INJECTION INTRAVENOUS ONCE
Status: COMPLETED | OUTPATIENT
Start: 2025-07-01 | End: 2025-07-01

## 2025-07-01 RX ADMIN — GADOBUTROL 8 ML: 604.72 INJECTION INTRAVENOUS at 18:01

## 2025-07-01 ASSESSMENT — COLUMBIA-SUICIDE SEVERITY RATING SCALE - C-SSRS
2. HAVE YOU ACTUALLY HAD ANY THOUGHTS OF KILLING YOURSELF IN THE PAST MONTH?: NO
1. IN THE PAST MONTH, HAVE YOU WISHED YOU WERE DEAD OR WISHED YOU COULD GO TO SLEEP AND NOT WAKE UP?: NO
6. HAVE YOU EVER DONE ANYTHING, STARTED TO DO ANYTHING, OR PREPARED TO DO ANYTHING TO END YOUR LIFE?: NO

## 2025-07-01 NOTE — PROGRESS NOTES
Patient presents with:  Headache: weakness in left arm, mild headache --for about 3 hours, weakness has subsided, lips was tingling, 3 days of abdominal pain         Clinical Decision Making:  Patient has multiple cardiovascular risk factors and had described symptoms of a TIA.  This typically she did not have her usual and customary headache but had a different type of headache with no prodrome.  She usually gets a migraine with aura and did not have her usual customary migraine and did not have an aura.  Additionally she had difficulty with mentation for briefly about 5 minutes felt that she was disoriented.  Had also had paresthesias and weakness into the left arm that she states was not able to be moved for several minutes.  Also was associated with lip tingling.  Symptoms lasted between 1230 to 3 PM.  Note was made of markedly elevated blood pressure in the office as well despite taking her amlodipine.  Patient is sent to the emergency room for evaluation of TIA.  Questions were answered to patient satisfaction before discharge.      ICD-10-CM    1. Acute nonintractable headache, unspecified headache type  R51.9       2. Left arm weakness  R29.898       3. Benign essential hypertension  I10       4. Mixed hyperlipidemia  E78.2           There are no Patient Instructions on file for this visit.    HPI:  Flor Lehman is a 49 year old female who presents today for signs and symptoms of TIA.  Patient states that she had symptoms between 1230 to 3 PM.  They have currently abated.  However she had symptoms of a headache that was not her usual and customary migraine and did not have her usual aura.  Additionally was associated with weakness in the left arm that lasted for at least 5 minutes.  She had difficulty with mentation and thinking that lasted for 5 minutes.  She does not report vision changes, headache, involvement of the left lower extremity or right side of the body, she did take her amlodipine hypertension  medication today but has markedly elevated blood pressure in the office.  She also had associated paresthesias in the lips while she was having the symptoms of weakness in the left arm.  She does not report chest pain, syncope presyncope or heart palpitations.  No reported balance deficits.  No treatment was tried for this at home.    History obtained from chart review and the patient.    Cardiovascular Risk factors:    Female, age 89  Positive Hypertension  Positive Hyperlipidemia  Negative diabetes mellitus  Positive obesity  Positive sedentary lifestyle  Positive family history  Negative smoker  Positive alcohol use  Negative illicit street drug use      Problem List:  2023-12: Regional enteritis (H)  2023-12: Anorectal polyp  2023-12: Major depressive disorder, single episode in full remission  2023-12: Anxiety state      No past medical history on file.    Social History     Tobacco Use    Smoking status: Never    Smokeless tobacco: Never   Substance Use Topics    Alcohol use: Not on file       Review of Systems  As above in HPI otherwise negative.    Vitals:    07/01/25 1525   BP: (!) 156/93   Pulse: 78   Resp: 18   Temp: 98.3  F (36.8  C)   TempSrc: Oral   SpO2: 97%   Weight: 80.7 kg (178 lb)       General: Patient is resting comfortably no acute distress is afebrile  HEENT: Head is normocephalic atraumatic   eyes are PERRL EOMI sclera anicteric   TMs are clear bilaterally  Throat is with mild pharyngeal wall erythema and no exudate  No cervical lymphadenopathy present  LUNGS: Clear to auscultation bilaterally  HEART: Regular rate and rhythm  Skin: Without rash non-diaphoretic  Neuro:  Rhomberg is negative  Both direct and consensual stimulation to light  No noted Nystagmus  Strength is 5/5 equal biaterally in both upper and lower extremities      Physical Exam    At the end of the encounter, I discussed results, diagnosis, medications. Discussed red flags for immediate return to clinic/ER, as well as  indications for follow up if no improvement. Patient understood and agreed to plan. Patient was stable for discharge.

## 2025-07-01 NOTE — Clinical Note
Flor Lehman was seen and treated in our emergency department on 7/1/2025.  She may return to work on 07/02/2025.       If you have any questions or concerns, please don't hesitate to call.      Baldev Kennedy MD

## 2025-07-01 NOTE — ED TRIAGE NOTES
"Pt coming in for evaluation of extremity weakness and numbness. States at 12:30 pt's L arm became so weak \"I could not even hold up a paper cup\". Weakness and numbness in L arm from 12:30-3pm. Hx of migraines but does not have a headache and did not get her usual aura. States for about 5 seconds she had trouble making sentences. All symptoms have since resolved. BG 84. Provider evaluated in triage and no stroke code called.     Triage Assessment (Adult)       Row Name 07/01/25 1608          Triage Assessment    Airway WDL WDL        Respiratory WDL    Respiratory WDL WDL        Skin Circulation/Temperature WDL    Skin Circulation/Temperature WDL WDL        Cardiac WDL    Cardiac WDL WDL        Peripheral/Neurovascular WDL    Peripheral Neurovascular WDL WDL        Cognitive/Neuro/Behavioral WDL    Cognitive/Neuro/Behavioral WDL WDL                     "

## 2025-07-01 NOTE — PROGRESS NOTES
Urgent Care Clinic Visit    Chief Complaint   Patient presents with    Headache     weakness in left arm, mild headache --for about 3 hours, weakness has subsided, lips was tingling, 3 days of abdominal pain

## 2025-07-01 NOTE — ED PROVIDER NOTES
EMERGENCY DEPARTMENT ENCOUNTER      NAME: Flor Lehman  AGE: 49 year old female  YOB: 1975  MRN: [id number]  EVALUATION DATE & TIME: No admission date for patient encounter.    PCP: Elis Rios    ED PROVIDER: Baldev Kennedy M.D.      Chief Complaint   Patient presents with    Extremity Weakness    Numbness         FINAL IMPRESSION:  1. Left arm weakness    2. Ataxia    3. Other migraine without status migrainosus, not intractable          ED COURSE & MEDICAL DECISION MAKING:    Pertinent Labs & Imaging studies reviewed below.  All EKGs below represent my independent interpretation.   ED Course as of 07/01/25 2250   Tue Jul 01, 2025   1620 Patient is a 49-year-old woman history of migraines who presents with 3 hours of left arm weakness, numbness, and gait instability.  Symptoms began at 12:30 PM, but have now completely resolved.  She has history of migraines that can also be associated with facial droop, numbness in her extremities, but she did not develop any headaches today.  I met her in triage when she arrived given her symptoms.  She was asymptomatic, and had an NIH stroke scale of 0, stroke code was not called.  I suspect these are likely a variant of migraine, however without headache, plan to get an MRI/MRA of the head and neck, monitor for symptoms.   1621 EKG shows sinus rhythm with a rate of 75.  Septal T wave inversions.  No acute ischemic ST elevation or depression.  Normal axis, normal intervals.  When compared prior EKG on November 21, 2017, there is no significant change.  Impression: Sinus rhythm without evidence of acute ischemia.   1858 MR Brain w/o & w Contrast  HEAD MRI:   1.  No acute intracranial process.     HEAD MRA:   1.  No aneurysm, high flow AVM or significant stenosis identified.     1900 I rechecked the patient.  No recurrence of symptoms.  Lab work is normal including CBC, BMP, troponin, glucose.  Was discharged with recommendation to follow-up with the  neurologist given new pattern of migraine.  She is not at high risk for stroke based on age, comorbidities. I not think that this represents hypertensive encephalopathy or TIA.       Additional ED Course timestamps entered by medical scribe:  4:00 PM I was called to triage to perform a neuro evaluation. No stroke code was called.   7:14 PM We discussed the plan for discharge and the patient is agreeable. Reviewed supportive cares, symptomatic treatment, outpatient follow up, and reasons to return to the Emergency Department. Patient to be discharged by ED RN.     Medical Decision Making    Discharge. No recommendations on prescription strength medication(s). N/A.    MIPS (CTPE, Dental pain, Campos, Sinusitis, Asthma/COPD, Head Trauma): Not Applicable    SEPSIS: None      MEDICATIONS GIVEN IN THE EMERGENCY:  Medications   gadobutrol (GADAVIST) injection 8 mL (8 mLs Intravenous $Given 7/1/25 180)         NEW PRESCRIPTIONS STARTED AT TODAY'S ER VISIT  Discharge Medication List as of 7/1/2025  7:19 PM             =================================================================    HPI    Flor Lehman is a 49 year old female who presents to this ED by walk-in for evaluation of extremity weakness and numbness.     This morning, patient started experiencing abdominal pain. Later at ~12:30 PM, she had a sudden onset of left arm weakness and numbness, lip numbness, and instability. Patient sat down on the grass for a few minutes and then went to lay in her office for a couple hours. She also reports one episode of speech difficulties that lasted for ~5 minutes. Patient spoke to her cardiologist who recommended she present to the ED for further evaluation. Currently in the ED, she reports her symptoms have resolved.     Denies any fevers, nausea, vomiting, or diarrhea. No headache, dizziness, or lightheadedness. History of migraines with aura. No history of diabetes mellitus.     Patient is otherwise in her normal state of  health with no other concerns.     VITALS:  BP (!) 159/95   Pulse 70   Resp 17   SpO2 100%     PHYSICAL EXAM    Constitutional: Comfortable appearing.  HENT: Atraumatic, mucous membranes moist, nose normal. Neck- Supple, gross ROM intact.   Eyes: Pupils mid-range, conjunctiva without injection, no discharge.   Respiratory: Clear to auscultation bilaterally, no respiratory distress, no wheezing, speaks full sentences easily. No cough.  Cardiovascular: Normal heart rate, regular rhythm, no murmurs.   GI: Soft, no tenderness to deep palpation in all quadrants, no masses.  Musculoskeletal: Moving all 4 extremities intentionally and without pain. No obvious deformity.  Skin: Warm, dry, no rash.  Neurologic: CN II-XII intact. No visual field deficits. No dysarthria or dysphagia. 5/5 strength in upper and lower extremities. Sensation to light touch intact in all 4 extremities. No pronator drift. Normal FNF and Heel to Shin exam bilaterally. Steady gait.  Psychiatric: Affect normal, cooperative.    National Institutes of Health Stroke Scale  Exam Interval: Baseline   Score    Level of consciousness: (0)   Alert, keenly responsive    LOC questions: (0)   Answers both questions correctly    LOC commands: (0)   Performs both tasks correctly    Best gaze: (0)   Normal    Visual: (0)   No visual loss    Facial palsy: (0)   Normal symmetrical movements    Motor arm (left): (0)   No drift    Motor arm (right): (0)   No drift    Motor leg (left): (0)   No drift    Motor leg (right): (0)   No drift    Limb ataxia: (0)   Absent    Sensory: (0)   Normal- no sensory loss    Best language: (0)   Normal- no aphasia    Dysarthria: (0)   Normal    Extinction and inattention: (0)   No abnormality        Total Score:  0         Shelia KEARNS, am serving as a scribe to document services personally performed by Dr. Baldev Kennedy based on my observation and the provider's statements to me. Baldev KEARNS MD attest that Shelia  Babs is acting in a scribe capacity, has observed my performance of the services and has documented them in accordance with my direction.    Baldev Kennedy M.D.  Emergency Medicine  [facility] EMERGENCY ROOM  [address] [facility] [address]  [phone]  Dept: [phone]     Baldev Kennedy MD  07/01/25 3049

## 2025-07-02 NOTE — DISCHARGE INSTRUCTIONS
Thankfully no sign of stroke, tumor, bleed, or any other dangerous cause of your symptoms.  I have a strong suspicion this was due to a variant of a migraine.  I recommend following up with your primary care doctor initially to recheck blood pressure, make sure any medication changes are needed for that.  Otherwise follow-up with your neurology clinic either by phone or in person will be important as well.

## 2025-07-02 NOTE — ED NOTES
Pt discharging from the . AVS reviewed with pt. Education provided on worsening symptoms to watch out for and close follow-up with PCP. All questions were answered. Vitally stable upon discharge.